# Patient Record
Sex: FEMALE | Race: WHITE | Employment: UNEMPLOYED | URBAN - METROPOLITAN AREA
[De-identification: names, ages, dates, MRNs, and addresses within clinical notes are randomized per-mention and may not be internally consistent; named-entity substitution may affect disease eponyms.]

---

## 2020-09-18 ENCOUNTER — APPOINTMENT (OUTPATIENT)
Dept: GENERAL RADIOLOGY | Age: 55
End: 2020-09-18
Payer: COMMERCIAL

## 2020-09-18 ENCOUNTER — HOSPITAL ENCOUNTER (EMERGENCY)
Age: 55
Discharge: HOME | End: 2020-09-18
Attending: EMERGENCY MEDICINE
Payer: COMMERCIAL

## 2020-09-18 VITALS
OXYGEN SATURATION: 99 % | HEART RATE: 88 BPM | BODY MASS INDEX: 40.3 KG/M2 | RESPIRATION RATE: 16 BRPM | TEMPERATURE: 98.1 F | HEIGHT: 58 IN | SYSTOLIC BLOOD PRESSURE: 150 MMHG | DIASTOLIC BLOOD PRESSURE: 86 MMHG | WEIGHT: 192 LBS

## 2020-09-18 PROCEDURE — 99283 EMERGENCY DEPT VISIT LOW MDM: CPT

## 2020-09-18 PROCEDURE — 6370000000 HC RX 637 (ALT 250 FOR IP): Performed by: EMERGENCY MEDICINE

## 2020-09-18 PROCEDURE — 73030 X-RAY EXAM OF SHOULDER: CPT

## 2020-09-18 RX ORDER — CYCLOBENZAPRINE HCL 10 MG
10 TABLET ORAL ONCE
Status: COMPLETED | OUTPATIENT
Start: 2020-09-18 | End: 2020-09-18

## 2020-09-18 RX ORDER — CYCLOBENZAPRINE HCL 10 MG
10 TABLET ORAL 2 TIMES DAILY PRN
Qty: 20 TABLET | Refills: 0 | Status: SHIPPED | OUTPATIENT
Start: 2020-09-18 | End: 2020-09-28 | Stop reason: SDUPTHER

## 2020-09-18 RX ADMIN — CYCLOBENZAPRINE 10 MG: 10 TABLET, FILM COATED ORAL at 17:36

## 2020-09-18 ASSESSMENT — PAIN DESCRIPTION - DESCRIPTORS
DESCRIPTORS: ACHING;SORE
DESCRIPTORS: ACHING

## 2020-09-18 ASSESSMENT — PAIN DESCRIPTION - LOCATION
LOCATION: SHOULDER
LOCATION: SHOULDER

## 2020-09-18 ASSESSMENT — ENCOUNTER SYMPTOMS
VOICE CHANGE: 0
TROUBLE SWALLOWING: 0
VOMITING: 0
ABDOMINAL PAIN: 0
WHEEZING: 0
SHORTNESS OF BREATH: 0
FACIAL SWELLING: 0
COLOR CHANGE: 0
NAUSEA: 0
STRIDOR: 0

## 2020-09-18 ASSESSMENT — PAIN DESCRIPTION - PAIN TYPE
TYPE: ACUTE PAIN
TYPE: ACUTE PAIN

## 2020-09-18 ASSESSMENT — PAIN SCALES - GENERAL
PAINLEVEL_OUTOF10: 5
PAINLEVEL_OUTOF10: 5

## 2020-09-18 ASSESSMENT — PAIN DESCRIPTION - ORIENTATION
ORIENTATION: RIGHT
ORIENTATION: RIGHT

## 2020-09-18 ASSESSMENT — PAIN DESCRIPTION - FREQUENCY: FREQUENCY: CONTINUOUS

## 2020-09-18 ASSESSMENT — PAIN - FUNCTIONAL ASSESSMENT: PAIN_FUNCTIONAL_ASSESSMENT: 0-10

## 2020-09-18 NOTE — ED TRIAGE NOTES
States she fell backwards while sitting in a deck chair. Attempted to catch herself and felt onset of pain. Is unable to abduct her shoulder.

## 2020-09-18 NOTE — ED PROVIDER NOTES
[address] [facility]  eMERGENCY dEPARTMENT eNCOUnter      Pt Name: Gia Salvador  MRN: [id number]  [place] 1965  Date of evaluation: 9/18/2020  Provider: Lucero Hawthorne MD    [address] [place] [address]       Chief Complaint   Patient presents with    Shoulder Pain         HISTORY OF PRESENT ILLNESS   (Location/Symptom, Timing/Onset, Context/Setting, Quality, Duration, Modifying Factors, Severity)  Note limiting factors. Gia Salvador is a 54 y.o. female who presents with approximately 2 hours of right shoulder pain. The patient reports that she was seated and fell over hitting her right elbow on the grassy ground. She denies any head neck or back injury or pain. She reports despite landing on her elbow she does not have any elbow pain but does have shoulder pain. She reports her shoulder pain is moderate, aching, constant, and worsening. Reports movement of the shoulder worsens the pain nothing improves it. She does report some limitation in abducting her shoulder. She denies any  strength weakness. She denies any numbness. HPI    Nursing Notes were reviewed. REVIEW OFSYSTEMS    (2-9 systems for level 4, 10 or more for level 5)     Review of Systems   Constitutional: Negative for appetite change, fever and unexpected weight change. HENT: Negative for facial swelling, trouble swallowing and voice change. Eyes: Negative for visual disturbance. Respiratory: Negative for shortness of breath, wheezing and stridor. Cardiovascular: Negative for chest pain and palpitations. Gastrointestinal: Negative for abdominal pain, nausea and vomiting. Genitourinary: Negative for dysuria and vaginal bleeding. Musculoskeletal: Positive for arthralgias. Negative for neck pain and neck stiffness. Skin: Negative for color change and wound. Neurological: Negative for seizures and syncope. Psychiatric/Behavioral: Negative for self-injury and suicidal ideas.        Except as noted above the remainder of the review of systems was reviewed and negative. PAST MEDICAL HISTORY     Past Medical History:   Diagnosis Date    COPD (chronic obstructive pulmonary disease) ([facility] [address]          SURGICAL HISTORY     History reviewed. No pertinent surgical history. CURRENT MEDICATIONS       Discharge Medication List as of 9/18/2020  5:54 PM      CONTINUE these medications which have NOT CHANGED    Details   ALBUTEROL IN Inhale  into the lungs. ALLERGIES     Patient has no known allergies. FAMILY HISTORY     History reviewed. No pertinent family history. SOCIAL HISTORY       Social History     Socioeconomic History    Marital status:      Spouse name: None    Number of children: None    Years of education: None    Highest education level: None   Occupational History    None   Social Needs    Financial resource strain: None    Food insecurity     Worry: None     Inability: None    Transportation needs     Medical: None     Non-medical: None   Tobacco Use    Smoking status: Current Every Day Smoker     Packs/day: 2.00    Smokeless tobacco: Never Used   Substance and Sexual Activity    Alcohol use:  Yes     Alcohol/week: 2.0 standard drinks     Types: 2 Standard drinks or equivalent per week    Drug use: Never    Sexual activity: None   Lifestyle    Physical activity     Days per week: None     Minutes per session: None    Stress: None   Relationships    Social connections     Talks on phone: None     Gets together: None     Attends Congregation service: None     Active member of club or organization: None     Attends meetings of clubs or organizations: None     Relationship status: None    Intimate partner violence     Fear of current or ex partner: None     Emotionally abused: None     Physically abused: None     Forced sexual activity: None   Other Topics Concern    None   Social History Narrative    None         PHYSICAL EXAM    (up to 7 for level 4, 8 or more for level 5)     ED Triage Vitals [09/18/20 1636]   BP Temp Temp Source Pulse Resp SpO2 Height Weight   (!) 148/87 98.1 °F (36.7 °C) Oral 97 18 97 % 4' 10\" (1.473 m) 192 lb (87.1 kg)       Physical Exam  Vitals signs and nursing note reviewed. Constitutional:       Appearance: She is well-developed. She is not diaphoretic. HENT:      Head: Normocephalic and atraumatic. Right Ear: External ear normal.      Left Ear: External ear normal.   Eyes:      Conjunctiva/sclera: Conjunctivae normal.   Neck:      Musculoskeletal: Neck supple. Vascular: No JVD. Trachea: No tracheal deviation. Cardiovascular:      Rate and Rhythm: Normal rate. Pulses: Normal pulses. Comments: 2+ popliteal, radial, ulnar pulses to the right upper extremity. Normal capillary refill to the right hand. Pulmonary:      Effort: Pulmonary effort is normal. No respiratory distress. Breath sounds: Normal breath sounds. No wheezing. Abdominal:      General: There is no distension. Palpations: Abdomen is soft. Tenderness: There is no abdominal tenderness. There is no guarding or rebound. Musculoskeletal: Normal range of motion. General: Tenderness (Mild right-sided anterior tenderness to palpation with no palpable deformity. No elbow, wrist, or hand tenderness. Full passive range of motion of right upper extremity but limited active abduction.) present. No swelling or deformity. Skin:     General: Skin is warm and dry. Neurological:      General: No focal deficit present. Mental Status: She is alert and oriented to person, place, and time. Cranial Nerves: No cranial nerve deficit. Comments: 5-5  strength equal bilaterally. Sensation motor function intact in radial, median, ulnar nerve distribution of the right upper extremity. Sensation intact in the axillary nerve distribution. Positive empty can test to the right side.          DIAGNOSTIC RESULTS       RADIOLOGY: Interpretation per the Radiologist below, if available at the time of this note:    XR SHOULDER RIGHT (MIN 2 VIEWS)   Final Result   No acute fracture or traumatic malalignment. Calcific tendinitis involving the infraspinatus tendon. ED BEDSIDE ULTRASOUND:   Performed by ED Physician - none    LABS:  Labs Reviewed - No data to display    All otherlabs were within normal range or not returned as of this dictation. EMERGENCY DEPARTMENT COURSE and DIFFERENTIAL DIAGNOSIS/MDM:   Vitals:    Vitals:    09/18/20 1636 09/18/20 1743   BP: (!) 148/87 (!) 150/86   Pulse: 97 88   Resp: 18 16   Temp: 98.1 °F (36.7 °C)    TempSrc: Oral    SpO2: 97% 99%   Weight: 192 lb (87.1 kg)    Height: 4' 10\" (1.473 m)          MDM  Plain film does show tendinitis but does not show any acute fractures location patient is neurovascularly intact. I primarily suspect rotator cuff injury based on history provided by the patient my physical exam but I have discussed with her the possibility of a peripheral nerve injury or occult fracture. I feel the patient is appropriate for NSAIDs, muscle relaxers, sling for comfort, and close orthopedic outpatient clinic follow-up. The importance of outpatient follow-up is stressed to the patient. Strict ER return precautions given for any numbness weakness or worsening of pain. The patient expresses understanding and agreement with this plan and is discharged home. I estimate there is low risk for COMPARTMENT SYNDROME, DEEP VENOUS THROMBOSIS, SEPTIC ARTHRITIS, TENDON OR NEUROVASCULAR INJURY, thus I consider the discharge disposition reasonable. The patient and I have discussed the diagnosis and risks, and we agree with discharging home to follow-up with their primary doctor or the referral orthopedist. We also discussed returning to the Emergency Department immediately if new or worsening symptoms occur.  We have discussed the symptoms which are most concerning (e.g., changing or worsening pain, numbness, weakness) that necessitate immediate return         Procedures    FINAL IMPRESSION      1. Acute pain of right shoulder          DISPOSITION/PLAN   DISPOSITION Decision To Discharge 09/18/2020 05:24:19 PM      PATIENT REFERRED TO:  Modesto Bolaños  [address] [facility]  [phone]  In 4 week      [address] [facility] Emergency Department  [address] [place] [address],[address]  [phone]    If symptoms worsen      DISCHARGE MEDICATIONS:  Discharge Medication List as of 9/18/2020  5:54 PM      START taking these medications    Details   cyclobenzaprine (FLEXERIL) 10 MG tablet Take 1 tablet by mouth 2 times daily as needed for Muscle spasms, Disp-20 tablet,R-0Print                (Please note that portions of this note were completed with a voice recognition program.  Efforts were made to edit the dictations but occasionally words aremis-transcribed. )    Dinesh Rizzo MD (electronically signed)  Attending Emergency Physician           Dinesh Rizzo MD  09/18/20 9312

## 2020-09-18 NOTE — ED NOTES
Sling fitted and placed on right upper extremity. Pt verbalizes understanding of use.       Lan Landaverde RN  09/18/20 8784

## 2020-09-23 ENCOUNTER — OFFICE VISIT (OUTPATIENT)
Dept: ORTHOPEDIC SURGERY | Age: 55
End: 2020-09-23
Payer: COMMERCIAL

## 2020-09-23 VITALS — HEIGHT: 58 IN | BODY MASS INDEX: 40.3 KG/M2 | WEIGHT: 192 LBS

## 2020-09-23 PROCEDURE — 3017F COLORECTAL CA SCREEN DOC REV: CPT | Performed by: ORTHOPAEDIC SURGERY

## 2020-09-23 PROCEDURE — 99203 OFFICE O/P NEW LOW 30 MIN: CPT | Performed by: ORTHOPAEDIC SURGERY

## 2020-09-23 PROCEDURE — G8417 CALC BMI ABV UP PARAM F/U: HCPCS | Performed by: ORTHOPAEDIC SURGERY

## 2020-09-23 PROCEDURE — 4004F PT TOBACCO SCREEN RCVD TLK: CPT | Performed by: ORTHOPAEDIC SURGERY

## 2020-09-23 PROCEDURE — G8427 DOCREV CUR MEDS BY ELIG CLIN: HCPCS | Performed by: ORTHOPAEDIC SURGERY

## 2020-09-23 NOTE — PROGRESS NOTES
review of systems will be found scanned in the patient's chart. CONSTITUTIONAL: Denies unexplained weight loss, fevers, chills   NEUROLOGICAL: Denies unsteady gait or progressive weakness  SKIN: Denies skin changes, delayed healing, rash, itching       PHYSICAL EXAM:    Vitals: Height 4' 9.99\" (1.473 m), weight 192 lb (87.1 kg). GENERAL EXAM:  · General Apparence: Patient is adequately groomed with no evidence of malnutrition. · Orientation: The patient is oriented to time, place and person. · Mood & Affect:The patient's mood and affect are appropriate       Right shoulder PHYSICAL EXAMINATION:  · Inspection: No visible deformity. No significant edema, erythema or ecchymosis. · Palpation: Tenderness to palpation at the subacromial space    · Range of Motion: Range of motion is significantly limited overhead particularly with abduction and forward flexion    · Strength: Strength is difficult to assess secondary to pain    · Special Tests: Positive Mathis testing. Difficulty with empty can testing. · Skin:  There are no rashes, ulcerations or lesions. · There are no dysvascular changes     Gait & station: Normal      Additional Examinations:        Left Upper Extremity: Examination of the left upper extremity does not show any tenderness, deformity or injury. Range of motion is unremarkable. There is no gross instability. There are no rashes, ulcerations or lesions. Strength and tone are normal.      Diagnostic Testing: The following x rays were read and interpreted by myself      1. X-rays of the right shoulder demonstrate calcific tendinitis of supraspinatus. Mild acromioclavicular arthritis and no acute fractures are noted.     Orders     Orders Placed This Encounter   Procedures    MRI SHOULDER RIGHT WO CONTRAST     Standing Status:   Future     Standing Expiration Date:   9/23/2021     Scheduling Instructions:      [facility] [address] @ [address]     Order Specific Question:   Reason for exam:     Answer:   RIGHT SHOULDER PAIN. R.O RCT         Assessment / Treatment Plan:     1. Right shoulder rotator cuff tendinitis versus tear    Given her acute injury and significant limitations with motion, we are going to obtain a MRI of the right shoulder to rule out rotator cuff tear. She will return to the clinic to review the results of the study. I have personally performed and/or participated in the history, exam and medical decision making and agree with all pertinent clinical information. I have also reviewed and agree with the past medical, family and social history unless otherwise noted. This dictation was performed with a verbal recognition program (DRAGON) and it was checked for errors. It is possible that there are still dictated errors within this office note. If so, please bring any errors to my attention for an addendum. All efforts were made to ensure that this office note is accurate.           Gordon Crisostomo MD

## 2020-09-28 ENCOUNTER — TELEPHONE (OUTPATIENT)
Dept: ORTHOPEDIC SURGERY | Age: 55
End: 2020-09-28

## 2020-09-28 RX ORDER — CYCLOBENZAPRINE HCL 10 MG
10 TABLET ORAL 2 TIMES DAILY PRN
Qty: 20 TABLET | Refills: 0 | Status: SHIPPED | OUTPATIENT
Start: 2020-09-28 | End: 2020-10-09

## 2020-09-30 ENCOUNTER — TELEPHONE (OUTPATIENT)
Dept: ORTHOPEDIC SURGERY | Age: 55
End: 2020-09-30

## 2020-09-30 NOTE — TELEPHONE ENCOUNTER
CALLED LVM FOR  PATIENT TODAY, STATING MRI IS APPROVED FOR [employer], & LEFT # FOR THEM TO CALL & SCHEDULE THAT. & TO MAKE AN FOLLOW UP APPT W/ DR. DUKE AFTER MRI.  [employer] Nakul Petty

## 2020-10-09 RX ORDER — CYCLOBENZAPRINE HCL 10 MG
10 TABLET ORAL 2 TIMES DAILY PRN
Qty: 20 TABLET | Refills: 0 | Status: SHIPPED | OUTPATIENT
Start: 2020-10-09 | End: 2020-10-22 | Stop reason: SDUPTHER

## 2020-10-12 ENCOUNTER — TELEPHONE (OUTPATIENT)
Dept: ORTHOPEDIC SURGERY | Age: 55
End: 2020-10-12

## 2020-10-12 NOTE — TELEPHONE ENCOUNTER
Patient called to discuss her MRI results. Her car would not start and she could not make it to her appointment. Unfortunately she has a tear of supraspinatus as well as the labrum. She also has some complex peritendinobursitis and capsulitis and a torn junction of the biceps. The next step would be for her to come in to discuss surgical intervention.   She can call and make an appointment when she can

## 2020-10-22 RX ORDER — CYCLOBENZAPRINE HCL 10 MG
10 TABLET ORAL 2 TIMES DAILY PRN
Qty: 20 TABLET | Refills: 0 | Status: SHIPPED | OUTPATIENT
Start: 2020-10-22 | End: 2020-10-29 | Stop reason: SDUPTHER

## 2020-10-26 ENCOUNTER — TELEPHONE (OUTPATIENT)
Dept: ORTHOPEDIC SURGERY | Age: 55
End: 2020-10-26

## 2020-10-29 ENCOUNTER — OFFICE VISIT (OUTPATIENT)
Dept: ORTHOPEDIC SURGERY | Age: 55
End: 2020-10-29
Payer: COMMERCIAL

## 2020-10-29 VITALS — HEIGHT: 58 IN | WEIGHT: 192 LBS | BODY MASS INDEX: 40.3 KG/M2

## 2020-10-29 PROCEDURE — 3017F COLORECTAL CA SCREEN DOC REV: CPT | Performed by: ORTHOPAEDIC SURGERY

## 2020-10-29 PROCEDURE — G8417 CALC BMI ABV UP PARAM F/U: HCPCS | Performed by: ORTHOPAEDIC SURGERY

## 2020-10-29 PROCEDURE — G8427 DOCREV CUR MEDS BY ELIG CLIN: HCPCS | Performed by: ORTHOPAEDIC SURGERY

## 2020-10-29 PROCEDURE — G8484 FLU IMMUNIZE NO ADMIN: HCPCS | Performed by: ORTHOPAEDIC SURGERY

## 2020-10-29 PROCEDURE — 99214 OFFICE O/P EST MOD 30 MIN: CPT | Performed by: ORTHOPAEDIC SURGERY

## 2020-10-29 PROCEDURE — 4004F PT TOBACCO SCREEN RCVD TLK: CPT | Performed by: ORTHOPAEDIC SURGERY

## 2020-10-29 PROCEDURE — L3670 SO ACRO/CLAV CAN WEB PRE OTS: HCPCS | Performed by: ORTHOPAEDIC SURGERY

## 2020-10-29 RX ORDER — CYCLOBENZAPRINE HCL 10 MG
10 TABLET ORAL 2 TIMES DAILY PRN
Qty: 40 TABLET | Refills: 0 | Status: SHIPPED | OUTPATIENT
Start: 2020-10-29 | End: 2020-11-23

## 2020-10-29 NOTE — PROGRESS NOTES
CHIEF COMPLAINT:    Chief Complaint   Patient presents with    Shoulder Pain     CK RIGHT SHOULDER, DISCUSS SX       HISTORY OF PRESENT ILLNESS:                The patient is a 54 y.o. female results of the right shoulder and discussed surgical treatment. She is an established patient with a MRI diagnosed rotator cuff tear and SLAP tear of the labrum. She remains very limited with range of motion and strength of the shoulder. She works as a . Past Medical History:   Diagnosis Date    COPD (chronic obstructive pulmonary disease) ([facility] [address]         Work Status: She works as a     The pain assessment was noted & is as follows:  Pain Assessment  Location of Pain: Shoulder  Location Modifiers: Right  Severity of Pain: 8  Quality of Pain: Aching  Duration of Pain: A few hours  Frequency of Pain: Intermittent]      Work Status/Functionality:     Past Medical History: Medical history form was reviewed today & can be found in the media tab  Past Medical History:   Diagnosis Date    COPD (chronic obstructive pulmonary disease) ([facility]       Past Surgical History:     No past surgical history on file. Current Medications:     Current Outpatient Medications:     cyclobenzaprine (FLEXERIL) 10 MG tablet, Take 1 tablet by mouth 2 times daily as needed for Muscle spasms, Disp: 40 tablet, Rfl: 0    ALBUTEROL IN, Inhale  into the lungs. , Disp: , Rfl:   Allergies:  Patient has no known allergies. Social History:    reports that she has been smoking. She has been smoking about 2.00 packs per day. She has never used smokeless tobacco. She reports current alcohol use of about 2.0 standard drinks of alcohol per week. She reports that she does not use drugs. Family History:   No family history on file. REVIEW OF SYSTEMS:   For new problems, a full review of systems will be found scanned in the patient's chart.   CONSTITUTIONAL: Denies unexplained weight loss, fevers, chills   NEUROLOGICAL: Denies unsteady gait or progressive weakness  SKIN: Denies skin changes, delayed healing, rash, itching       PHYSICAL EXAM:    Vitals: Height 4' 9.99\" (1.473 m), weight 192 lb (87.1 kg). GENERAL EXAM:  · General Apparence: Patient is adequately groomed with no evidence of malnutrition. · Orientation: The patient is oriented to time, place and person. · Mood & Affect:The patient's mood and affect are appropriate       Right shoulder PHYSICAL EXAMINATION:  · Inspection: No visible deformity. No significant edema, erythema or ecchymosis    · Palpation: Tenderness to palpation    · Range of Motion: Range of motion is limited overhead to approximately 90 degrees degrees of abduction    · Strength: Isolated supraspinous strength testing reproduces weakness    · Special Tests: None performed today          · Skin:  There are no rashes, ulcerations or lesions. · There are no dysvascular changes     Gait & station: Normal      Additional Examinations:        Left Upper Extremity: Examination of the left upper extremity does not show any tenderness, deformity or injury. Range of motion is unremarkable. There is no gross instability. There are no rashes, ulcerations or lesions. Strength and tone are normal.      Diagnostic Testing: The following x rays were read and interpreted by myself      1. MRI of the right shoulder demonstrates a complete minimally retracted tear of the supraspinatus tendon bursal fibers infraspinatus insertions are torn. Moderate complex peritendinobursitis and capsulitis. Mild muscle fatty atrophy. Calcification adjacent to the infraspinatus insertion consistent with peritendinitis calcarea  2. Torn junction of the bicep arcuate and proximal vertical segments. Moderate inflammation within the biceps sheath. 3.  Frayed hypertrophic subscapularis insertion. 4. SLAP type IIb tear.     Orders     Orders Placed This Encounter   Procedures    Breg Sling Shot 2 Shoulder Sling     Patient was prescribed a Breg Sling Shot II Shoulder Brace. The right shoulder will require stabilization / immobilization from this orthosis. The orthosis will assist in protecting the affected area, provide functional support and facilitate healing. The device was ordered and fit on 10/29/2020. The patient was educated and fit by a healthcare professional with expert knowledge and specialization in brace application while under the direct supervision of the treating physician. Verbal and written instructions for the use of and application of this item were provided. They were instructed to contact the office immediately should the brace result in increased pain, decreased sensation, increased swelling or worsening of the condition. Assessment / Treatment Plan:     1. Rotator cuff tear right shoulder    2. SLAP labral tear right shoulder    Discussed the nature of her injury and reviewed her MRI results. We discussed with her that given the full-thickness nature of her rotator cuff tear, we will need to repair this surgically. We discussed a possible repair versus debridement of the labrum. Plan to perform a right shoulder video arthroscopy with neer acromioplasty, debridement versus repair of the labrum, and mini open rotator cuff repair    Provided with a postop sling today    We discussed shoulder arthroscopy surgery in detail. We talked about the arthroscopic nature of the procedure, the portals utilized and what can be done through these portals. We also discussed concerns regarding surgery, specifically including infection, deep vein thrombosis, pulmonary dystrophy, arthrofibrosis, delayed rehabilitation, etc.  We also discussed the possibility of an interscalene block and that anesthesia personnel would talk to them about this procedure and any concerns in that regard.   We also touched briefly on the degree of rotator cuff damage that can be treated and labral damage that can be treated and how it is always possible that the injury is more severe than expected on clinical examination by MRI. The reality is that if there is a full thickness rotator cuff tear, the treatment is dramatically different and the rehabilitation much slower. The same would be true of labral pathology, whether it be an anterior labral tear or a slap lesion that would potentially require repair versus a lesion that can just be debrided. We also discussed prophylaxis in terms of positioning carefully intravenous antibiotics preoperative, etc. All questions were answered from the patient. 3.  Tobacco abuse    I have educated her about the impacts of tobacco use and soft tissue healing after surgery like this. She understands that her smoking may delay her healing postoperatively. I have personally performed and/or participated in the history, exam and medical decision making and agree with all pertinent clinical information. I have also reviewed and agree with the past medical, family and social history unless otherwise noted. This dictation was performed with a verbal recognition program (DRAGON) and it was checked for errors. It is possible that there are still dictated errors within this office note. If so, please bring any errors to my attention for an addendum. All efforts were made to ensure that this office note is accurate.           Mamie Leonardo MD

## 2020-11-05 ENCOUNTER — TELEPHONE (OUTPATIENT)
Dept: ORTHOPEDIC SURGERY | Age: 55
End: 2020-11-05

## 2020-11-05 NOTE — TELEPHONE ENCOUNTER
CPT: 83289, [id number], [id number]  AUTHORIZATION: pending  BODY PART: right shoulder    Clinicals faxed to [facility] 11/5/20    [facility] denied [id number]  If you would like to set up a peer to peer call [phone] within 5 business days.  See denial letter under media

## 2020-11-16 NOTE — PROGRESS NOTES
Sapna Whiteside    Age 54 y.o.    female    1965    MRN [id number]    11/24/2020  Arrival Time_____________  OR Time____________90 Min     Procedure(s):  EXAM UNDER ANESTHESIA VIDEO ARTHROSCOPY RIGHT SHOULDER, MINI-OPEN ROTATOR CUFF REPAIR, NEER ACROMIOPLASTY, DEBRIDEMENT VERSUS REPAIR OF LABRUM -EXPAREL-                      General    Surgeon(s):  Dara Hoyos, MD       Phone [phone] ([place])     [address] [place] [address]  Cell Work  _________________________________________________________________  _________________________________________________________________  _________________________________________________________________  _________________________________________________________________  _________________________________________________________________      PCP _____________________________ Phone_________________       H&P__________________Bringing    Chart            Epic  DOS     Called_______  EKG__________________Bringing    Chart            Epic  DOS     Called_______  LAB__________________ Bringing    Chart            Epic  DOS     Called_______  Cardiac Clearance_______Bringing    Chart            Epic      DOS       Called_______    Cardiologist________________________ Phone___________________________      ? Sabianist concerns / Waiver on Chart            PAT Communications_____________  ? Pre-op Instructions Given [place]          ______________________________  ? Directions to Surgery Center                          ______________________________  ? Transportation Home_______________      _______________________________  ?  Crutches/Walker__________________        _______________________________      ________Pre-op Orders   _______Transcribed    _______Wt.  ________Pharmacy          _______SCD  ______VTE     ______[facility]  ________Consent             ________TED [place]

## 2020-11-17 NOTE — PROGRESS NOTES
Preoperative Screening for Elective Surgery/Invasive Procedures While COVID-19 present in the community     Have you tested positive or have been told to self-isolate for COVID-19 like symptoms within the past 28 days? No   Do you currently have any of the following symptoms? No  o Fever >100.0 F or 99.9 F in immunocompromised patients? No  o New onset cough, shortness of breath or difficulty breathing? No  o New onset sore throat, myalgia (muscle aches and pains), headache, loss of taste/smell or diarrhea? No   Have you had a potential exposure to COVID-19 within the past 14 days by:  o Close contact with a confirmed case? No  o Close contact with a healthcare worker,  or essential infrastructure worker (grocery store, [employer], gas station, public utilities or transportation)? No  o Do you reside in a congregate setting such as; skilled nursing facility, adult home, correctional facility, homeless shelter or other institutional setting? No  o Have you had recent travel to a known COVID-19 hotspot? No    Indicate if the patient has a positive screen by answering yes to one or more of the above questions. Patients who test positive or screen positive prior to surgery or on the day of surgery should be evaluated in conjunction with the surgeon/proceduralist/anesthesiologist to determine the urgency of the procedure.   Melissa Malave

## 2020-11-17 NOTE — PROGRESS NOTES
Obstructive Sleep Apnea (DALLAS) Screening     Patient:  Swetha Yadav    YOB: 1965      Medical Record #:  [id number]                     Date:  11/17/2020     1. Are you a loud and/or regular snorer? [x]  Yes       [] No    2. Have you been observed to gasp or stop breathing during sleep? []  Yes       [x] No    3. Do you feel tired or groggy upon awakening or do you awaken with a headache?           []  Yes       [x] No    4. Are you often tired or fatigued during the wake time hours? []  Yes       [x] No    5. Do you fall asleep sitting, reading, watching TV or driving? []  Yes       [x] No    6. Do you often have problems with memory or concentration? []  Yes       [x] No    **If patient's score is ? 3 they are considered high risk for DALLAS. An Anesthesia provider will evaluate the patient and develop a plan of care the day of surgery. Note:  If the patient's BMI is more than 35 kg m¯² , has neck circumference > 40 cm, and/or high blood pressure the risk is greater (© American Sleep Apnea Association, 2006).

## 2020-11-17 NOTE — PROGRESS NOTES
Patient instructed to:  Bring picture ID, insurance card,proof of address  Dress in comfortable,loose clothing,no jewelry, no make up/or finger polish/nocontact lenses dos if applicable  ALL Canda Fuelling on operative limb must be removed prior to DOS -if applicable  Nothing to eat or drink after midnight the night before surgery   If instructed to take medicines day of surgery by PCP, take only with sips of water  If you are taking insulin or diabetic medications check with your PCP to adjust doses according to your NPO (not eating) status  Arrange for transportation to and from surgery  With a caregiver staying with you for 24 hours  --make sure you are bring appropriate clothes to fit over large operative drsg - if applicable  Bring copies of H&P and or ekg dos     If your are taking NSAIDS/ASA products/vitamins regularly confirm this with your surgeon regarding taking them preop 5 days before surgery     Notify your Surgeon if you develop any illness between now and surgery time; cough, cold, fever, sore throat, nausea, vomiting,  covid  symtoms etc.

## 2020-11-19 ENCOUNTER — OFFICE VISIT (OUTPATIENT)
Dept: PRIMARY CARE CLINIC | Age: 55
End: 2020-11-19
Payer: COMMERCIAL

## 2020-11-19 PROCEDURE — 99211 OFF/OP EST MAY X REQ PHY/QHP: CPT | Performed by: NURSE PRACTITIONER

## 2020-11-19 NOTE — PROGRESS NOTES
Juanita Medina received a viral test for COVID-19. They were educated on isolation and quarantine as appropriate. For any symptoms, they were directed to seek care from their PCP, given contact information to establish with a doctor, directed to an urgent care or the emergency room.

## 2020-11-20 LAB — SARS-COV-2: NOT DETECTED

## 2020-11-23 ENCOUNTER — ANESTHESIA EVENT (OUTPATIENT)
Dept: OPERATING ROOM | Age: 55
End: 2020-11-23
Payer: COMMERCIAL

## 2020-11-23 RX ORDER — CYCLOBENZAPRINE HCL 10 MG
10 TABLET ORAL 2 TIMES DAILY PRN
Qty: 40 TABLET | Refills: 0 | Status: SHIPPED | OUTPATIENT
Start: 2020-11-23 | End: 2020-12-23 | Stop reason: SDUPTHER

## 2020-11-24 ENCOUNTER — ANESTHESIA (OUTPATIENT)
Dept: OPERATING ROOM | Age: 55
End: 2020-11-24
Payer: COMMERCIAL

## 2020-11-24 ENCOUNTER — HOSPITAL ENCOUNTER (OUTPATIENT)
Age: 55
Setting detail: OUTPATIENT SURGERY
Discharge: HOME | End: 2020-11-24
Attending: ORTHOPAEDIC SURGERY | Admitting: ORTHOPAEDIC SURGERY
Payer: COMMERCIAL

## 2020-11-24 VITALS
DIASTOLIC BLOOD PRESSURE: 51 MMHG | HEIGHT: 58 IN | WEIGHT: 195 LBS | OXYGEN SATURATION: 100 % | TEMPERATURE: 97 F | SYSTOLIC BLOOD PRESSURE: 127 MMHG | HEART RATE: 89 BPM | RESPIRATION RATE: 12 BRPM | BODY MASS INDEX: 40.93 KG/M2

## 2020-11-24 VITALS
OXYGEN SATURATION: 99 % | RESPIRATION RATE: 6 BRPM | DIASTOLIC BLOOD PRESSURE: 112 MMHG | SYSTOLIC BLOOD PRESSURE: 153 MMHG

## 2020-11-24 PROCEDURE — 2580000003 HC RX 258: Performed by: ANESTHESIOLOGY

## 2020-11-24 PROCEDURE — 3700000001 HC ADD 15 MINUTES (ANESTHESIA): Performed by: ORTHOPAEDIC SURGERY

## 2020-11-24 PROCEDURE — 6370000000 HC RX 637 (ALT 250 FOR IP): Performed by: ANESTHESIOLOGY

## 2020-11-24 PROCEDURE — 2500000003 HC RX 250 WO HCPCS: Performed by: NURSE ANESTHETIST, CERTIFIED REGISTERED

## 2020-11-24 PROCEDURE — 6360000002 HC RX W HCPCS: Performed by: ANESTHESIOLOGY

## 2020-11-24 PROCEDURE — 7100000011 HC PHASE II RECOVERY - ADDTL 15 MIN: Performed by: ORTHOPAEDIC SURGERY

## 2020-11-24 PROCEDURE — 7100000010 HC PHASE II RECOVERY - FIRST 15 MIN: Performed by: ORTHOPAEDIC SURGERY

## 2020-11-24 PROCEDURE — C9290 INJ, BUPIVACAINE LIPOSOME: HCPCS | Performed by: ORTHOPAEDIC SURGERY

## 2020-11-24 PROCEDURE — 6360000002 HC RX W HCPCS: Performed by: NURSE ANESTHETIST, CERTIFIED REGISTERED

## 2020-11-24 PROCEDURE — 3600000014 HC SURGERY LEVEL 4 ADDTL 15MIN: Performed by: ORTHOPAEDIC SURGERY

## 2020-11-24 PROCEDURE — 2500000003 HC RX 250 WO HCPCS: Performed by: ORTHOPAEDIC SURGERY

## 2020-11-24 PROCEDURE — 2720000010 HC SURG SUPPLY STERILE: Performed by: ORTHOPAEDIC SURGERY

## 2020-11-24 PROCEDURE — 7100000000 HC PACU RECOVERY - FIRST 15 MIN: Performed by: ORTHOPAEDIC SURGERY

## 2020-11-24 PROCEDURE — 2580000003 HC RX 258: Performed by: ORTHOPAEDIC SURGERY

## 2020-11-24 PROCEDURE — 3600000004 HC SURGERY LEVEL 4 BASE: Performed by: ORTHOPAEDIC SURGERY

## 2020-11-24 PROCEDURE — 2709999900 HC NON-CHARGEABLE SUPPLY: Performed by: ORTHOPAEDIC SURGERY

## 2020-11-24 PROCEDURE — C1713 ANCHOR/SCREW BN/BN,TIS/BN: HCPCS | Performed by: ORTHOPAEDIC SURGERY

## 2020-11-24 PROCEDURE — 3700000000 HC ANESTHESIA ATTENDED CARE: Performed by: ORTHOPAEDIC SURGERY

## 2020-11-24 PROCEDURE — 7100000001 HC PACU RECOVERY - ADDTL 15 MIN: Performed by: ORTHOPAEDIC SURGERY

## 2020-11-24 PROCEDURE — 6360000002 HC RX W HCPCS: Performed by: ORTHOPAEDIC SURGERY

## 2020-11-24 DEVICE — HEALICOIL RG SA 5.5MM W/2 UB-BL                                    CBRD BL
Type: IMPLANTABLE DEVICE | Site: SHOULDER | Status: FUNCTIONAL
Brand: HEALICOIL / ULTRABRAID

## 2020-11-24 DEVICE — MULTIFIX S-ULTRA 5.5MM KNOTLESS ANCHOR
Type: IMPLANTABLE DEVICE | Site: SHOULDER | Status: FUNCTIONAL
Brand: MULTIFIX

## 2020-11-24 RX ORDER — CEFAZOLIN SODIUM 1 G/3ML
INJECTION, POWDER, FOR SOLUTION INTRAMUSCULAR; INTRAVENOUS PRN
Status: DISCONTINUED | OUTPATIENT
Start: 2020-11-24 | End: 2020-11-24 | Stop reason: SDUPTHER

## 2020-11-24 RX ORDER — ONDANSETRON 2 MG/ML
INJECTION INTRAMUSCULAR; INTRAVENOUS PRN
Status: DISCONTINUED | OUTPATIENT
Start: 2020-11-24 | End: 2020-11-24 | Stop reason: SDUPTHER

## 2020-11-24 RX ORDER — MORPHINE SULFATE 2 MG/ML
2 INJECTION, SOLUTION INTRAMUSCULAR; INTRAVENOUS EVERY 5 MIN PRN
Status: DISCONTINUED | OUTPATIENT
Start: 2020-11-24 | End: 2020-11-24 | Stop reason: HOSPADM

## 2020-11-24 RX ORDER — MORPHINE SULFATE 2 MG/ML
1 INJECTION, SOLUTION INTRAMUSCULAR; INTRAVENOUS EVERY 5 MIN PRN
Status: DISCONTINUED | OUTPATIENT
Start: 2020-11-24 | End: 2020-11-24 | Stop reason: HOSPADM

## 2020-11-24 RX ORDER — OXYCODONE HYDROCHLORIDE AND ACETAMINOPHEN 5; 325 MG/1; MG/1
2 TABLET ORAL PRN
Status: COMPLETED | OUTPATIENT
Start: 2020-11-24 | End: 2020-11-24

## 2020-11-24 RX ORDER — MEPERIDINE HYDROCHLORIDE 50 MG/ML
12.5 INJECTION INTRAMUSCULAR; INTRAVENOUS; SUBCUTANEOUS EVERY 5 MIN PRN
Status: DISCONTINUED | OUTPATIENT
Start: 2020-11-24 | End: 2020-11-24 | Stop reason: HOSPADM

## 2020-11-24 RX ORDER — BUPIVACAINE HYDROCHLORIDE 2.5 MG/ML
INJECTION, SOLUTION INFILTRATION; PERINEURAL PRN
Status: DISCONTINUED | OUTPATIENT
Start: 2020-11-24 | End: 2020-11-24 | Stop reason: ALTCHOICE

## 2020-11-24 RX ORDER — LIDOCAINE HYDROCHLORIDE 10 MG/ML
1 INJECTION, SOLUTION EPIDURAL; INFILTRATION; INTRACAUDAL; PERINEURAL
Status: DISCONTINUED | OUTPATIENT
Start: 2020-11-24 | End: 2020-11-24 | Stop reason: HOSPADM

## 2020-11-24 RX ORDER — SODIUM CHLORIDE 0.9 % (FLUSH) 0.9 %
10 SYRINGE (ML) INJECTION EVERY 12 HOURS SCHEDULED
Status: DISCONTINUED | OUTPATIENT
Start: 2020-11-24 | End: 2020-11-24 | Stop reason: HOSPADM

## 2020-11-24 RX ORDER — FENTANYL CITRATE 50 UG/ML
INJECTION, SOLUTION INTRAMUSCULAR; INTRAVENOUS PRN
Status: DISCONTINUED | OUTPATIENT
Start: 2020-11-24 | End: 2020-11-24 | Stop reason: SDUPTHER

## 2020-11-24 RX ORDER — ONDANSETRON 2 MG/ML
4 INJECTION INTRAMUSCULAR; INTRAVENOUS
Status: DISCONTINUED | OUTPATIENT
Start: 2020-11-24 | End: 2020-11-24 | Stop reason: HOSPADM

## 2020-11-24 RX ORDER — MIDAZOLAM HYDROCHLORIDE 1 MG/ML
INJECTION INTRAMUSCULAR; INTRAVENOUS PRN
Status: DISCONTINUED | OUTPATIENT
Start: 2020-11-24 | End: 2020-11-24 | Stop reason: SDUPTHER

## 2020-11-24 RX ORDER — OXYCODONE HYDROCHLORIDE AND ACETAMINOPHEN 5; 325 MG/1; MG/1
1 TABLET ORAL EVERY 6 HOURS PRN
Qty: 28 TABLET | Refills: 0 | Status: SHIPPED | OUTPATIENT
Start: 2020-11-24 | End: 2020-11-27 | Stop reason: SDUPTHER

## 2020-11-24 RX ORDER — DEXAMETHASONE SODIUM PHOSPHATE 10 MG/ML
INJECTION INTRAMUSCULAR; INTRAVENOUS PRN
Status: DISCONTINUED | OUTPATIENT
Start: 2020-11-24 | End: 2020-11-24 | Stop reason: SDUPTHER

## 2020-11-24 RX ORDER — SODIUM CHLORIDE 0.9 % (FLUSH) 0.9 %
10 SYRINGE (ML) INJECTION PRN
Status: DISCONTINUED | OUTPATIENT
Start: 2020-11-24 | End: 2020-11-24 | Stop reason: HOSPADM

## 2020-11-24 RX ORDER — ROCURONIUM BROMIDE 10 MG/ML
INJECTION, SOLUTION INTRAVENOUS PRN
Status: DISCONTINUED | OUTPATIENT
Start: 2020-11-24 | End: 2020-11-24 | Stop reason: SDUPTHER

## 2020-11-24 RX ORDER — OXYCODONE HYDROCHLORIDE AND ACETAMINOPHEN 5; 325 MG/1; MG/1
1 TABLET ORAL PRN
Status: COMPLETED | OUTPATIENT
Start: 2020-11-24 | End: 2020-11-24

## 2020-11-24 RX ORDER — SODIUM CHLORIDE, SODIUM LACTATE, POTASSIUM CHLORIDE, CALCIUM CHLORIDE 600; 310; 30; 20 MG/100ML; MG/100ML; MG/100ML; MG/100ML
INJECTION, SOLUTION INTRAVENOUS CONTINUOUS
Status: DISCONTINUED | OUTPATIENT
Start: 2020-11-24 | End: 2020-11-24 | Stop reason: HOSPADM

## 2020-11-24 RX ORDER — PROPOFOL 10 MG/ML
INJECTION, EMULSION INTRAVENOUS PRN
Status: DISCONTINUED | OUTPATIENT
Start: 2020-11-24 | End: 2020-11-24 | Stop reason: SDUPTHER

## 2020-11-24 RX ORDER — LABETALOL HYDROCHLORIDE 5 MG/ML
INJECTION, SOLUTION INTRAVENOUS PRN
Status: DISCONTINUED | OUTPATIENT
Start: 2020-11-24 | End: 2020-11-24 | Stop reason: SDUPTHER

## 2020-11-24 RX ADMIN — ONDANSETRON 4 MG: 2 INJECTION INTRAMUSCULAR; INTRAVENOUS at 14:27

## 2020-11-24 RX ADMIN — PROPOFOL 200 MG: 10 INJECTION, EMULSION INTRAVENOUS at 14:08

## 2020-11-24 RX ADMIN — FENTANYL CITRATE 100 MCG: 50 INJECTION INTRAMUSCULAR; INTRAVENOUS at 14:08

## 2020-11-24 RX ADMIN — ROCURONIUM BROMIDE 50 MG: 10 SOLUTION INTRAVENOUS at 14:08

## 2020-11-24 RX ADMIN — SUGAMMADEX 200 MG: 100 INJECTION, SOLUTION INTRAVENOUS at 15:05

## 2020-11-24 RX ADMIN — CEFAZOLIN 2000 MG: 1 INJECTION, POWDER, FOR SOLUTION INTRAMUSCULAR; INTRAVENOUS at 14:04

## 2020-11-24 RX ADMIN — DEXAMETHASONE SODIUM PHOSPHATE 10 MG: 10 INJECTION INTRAMUSCULAR; INTRAVENOUS at 14:27

## 2020-11-24 RX ADMIN — HYDROMORPHONE HYDROCHLORIDE 0.5 MG: 1 INJECTION, SOLUTION INTRAMUSCULAR; INTRAVENOUS; SUBCUTANEOUS at 15:38

## 2020-11-24 RX ADMIN — HYDROMORPHONE HYDROCHLORIDE 0.5 MG: 1 INJECTION, SOLUTION INTRAMUSCULAR; INTRAVENOUS; SUBCUTANEOUS at 15:50

## 2020-11-24 RX ADMIN — LABETALOL HYDROCHLORIDE 5 MG: 5 INJECTION, SOLUTION INTRAVENOUS at 14:14

## 2020-11-24 RX ADMIN — HYDROMORPHONE HYDROCHLORIDE 0.5 MG: 1 INJECTION, SOLUTION INTRAMUSCULAR; INTRAVENOUS; SUBCUTANEOUS at 15:25

## 2020-11-24 RX ADMIN — FENTANYL CITRATE 50 MCG: 50 INJECTION INTRAMUSCULAR; INTRAVENOUS at 15:12

## 2020-11-24 RX ADMIN — FENTANYL CITRATE 50 MCG: 50 INJECTION INTRAMUSCULAR; INTRAVENOUS at 14:14

## 2020-11-24 RX ADMIN — HYDROMORPHONE HYDROCHLORIDE 0.5 MG: 1 INJECTION, SOLUTION INTRAMUSCULAR; INTRAVENOUS; SUBCUTANEOUS at 15:32

## 2020-11-24 RX ADMIN — MIDAZOLAM HYDROCHLORIDE 2 MG: 2 INJECTION, SOLUTION INTRAMUSCULAR; INTRAVENOUS at 14:08

## 2020-11-24 RX ADMIN — SODIUM CHLORIDE, POTASSIUM CHLORIDE, SODIUM LACTATE AND CALCIUM CHLORIDE: 600; 310; 30; 20 INJECTION, SOLUTION INTRAVENOUS at 12:50

## 2020-11-24 RX ADMIN — SODIUM CHLORIDE, POTASSIUM CHLORIDE, SODIUM LACTATE AND CALCIUM CHLORIDE: 600; 310; 30; 20 INJECTION, SOLUTION INTRAVENOUS at 15:04

## 2020-11-24 RX ADMIN — OXYCODONE HYDROCHLORIDE AND ACETAMINOPHEN 2 TABLET: 5; 325 TABLET ORAL at 15:35

## 2020-11-24 RX ADMIN — FENTANYL CITRATE 50 MCG: 50 INJECTION INTRAMUSCULAR; INTRAVENOUS at 15:14

## 2020-11-24 ASSESSMENT — PULMONARY FUNCTION TESTS
PIF_VALUE: 28
PIF_VALUE: 30
PIF_VALUE: 20
PIF_VALUE: 28
PIF_VALUE: 34
PIF_VALUE: 34
PIF_VALUE: 32
PIF_VALUE: 31
PIF_VALUE: 32
PIF_VALUE: 35
PIF_VALUE: 27
PIF_VALUE: 7
PIF_VALUE: 28
PIF_VALUE: 13
PIF_VALUE: 28
PIF_VALUE: 0
PIF_VALUE: 0
PIF_VALUE: 2
PIF_VALUE: 31
PIF_VALUE: 26
PIF_VALUE: 15
PIF_VALUE: 3
PIF_VALUE: 30
PIF_VALUE: 25
PIF_VALUE: 25
PIF_VALUE: 32
PIF_VALUE: 30
PIF_VALUE: 0
PIF_VALUE: 27
PIF_VALUE: 31
PIF_VALUE: 32
PIF_VALUE: 27
PIF_VALUE: 34
PIF_VALUE: 0
PIF_VALUE: 29
PIF_VALUE: 29
PIF_VALUE: 30
PIF_VALUE: 28
PIF_VALUE: 32
PIF_VALUE: 26
PIF_VALUE: 30
PIF_VALUE: 19
PIF_VALUE: 25
PIF_VALUE: 1
PIF_VALUE: 25
PIF_VALUE: 3
PIF_VALUE: 29
PIF_VALUE: 30
PIF_VALUE: 2
PIF_VALUE: 31
PIF_VALUE: 30
PIF_VALUE: 26
PIF_VALUE: 35
PIF_VALUE: 28
PIF_VALUE: 34
PIF_VALUE: 28
PIF_VALUE: 31
PIF_VALUE: 28
PIF_VALUE: 15
PIF_VALUE: 28
PIF_VALUE: 29
PIF_VALUE: 33
PIF_VALUE: 32
PIF_VALUE: 26
PIF_VALUE: 28
PIF_VALUE: 31
PIF_VALUE: 3
PIF_VALUE: 26
PIF_VALUE: 28
PIF_VALUE: 28
PIF_VALUE: 31
PIF_VALUE: 15
PIF_VALUE: 32
PIF_VALUE: 28
PIF_VALUE: 18

## 2020-11-24 ASSESSMENT — PAIN SCALES - GENERAL
PAINLEVEL_OUTOF10: 8
PAINLEVEL_OUTOF10: 10
PAINLEVEL_OUTOF10: 8
PAINLEVEL_OUTOF10: 8
PAINLEVEL_OUTOF10: 0
PAINLEVEL_OUTOF10: 8
PAINLEVEL_OUTOF10: 9
PAINLEVEL_OUTOF10: 7
PAINLEVEL_OUTOF10: 5
PAINLEVEL_OUTOF10: 4
PAINLEVEL_OUTOF10: 10

## 2020-11-24 ASSESSMENT — ENCOUNTER SYMPTOMS: SHORTNESS OF BREATH: 1

## 2020-11-24 ASSESSMENT — LIFESTYLE VARIABLES: SMOKING_STATUS: 1

## 2020-11-24 NOTE — H&P
I have reviewed the history and physical and examined the patient and find no relevant changes. I have reviewed with the patient and/or family the risks, benefits, and alternatives to the procedure. Patient being given increased dosage/quantity of opoid pain medication in excess of OSMB guidelines which noted a 30 MED daily of opioids due to the fact that he/she has undergone major orthopaedic surgery as outlined in rule 4731-11-13. Dosages and further duration of the pain medication will be re-evaluated at her post op visit in 2 weeks. Patient was educated on dosing expectations and limits of prescribing as a result of the new [facility] Board rules enacted August 31, 2017. Please also note that this is not the initial opoid prescription issued to this patient but a continuation of medication utilized during the hospital admission as noted in the medical record. OARRS report has also been utilized to screen for any abuse history or suspicious activity as outlined in [place]. All efforts have been taken to prevent abuse potential and misuse of opioid medications including education, screening, and close clinical follow up. Controlled Substance Monitoring:    Acute and Chronic Pain Monitoring:   RX Monitoring 11/24/2020   Periodic Controlled Substance Monitoring No signs of potential drug abuse or diversion identified.

## 2020-11-24 NOTE — BRIEF OP NOTE
Brief Postoperative Note      Patient: Hansa Ambrosio  YOB: 1965  MRN: [id number]    Date of Procedure: 11/24/2020    Pre-Op Diagnosis: ROTATOR CUFF TEAR RIGHT SHOULDER    Post-Op Diagnosis: Same       Procedure(s):  EXAM UNDER ANESTHESIA VIDEO ARTHROSCOPY RIGHT SHOULDER, MINI-OPEN ROTATOR CUFF REPAIR, NEER ACROMIOPLASTY, DEBRIDEMENT LABRUM -EXPAREL-    Surgeon(s):  Alyssa Delgado MD    Assistant:  Surgical Assistant: Phil Bellamy  Physician Assistant: FER Jimenez    Anesthesia: General    Estimated Blood Loss (mL): less than 50     Complications: None    Specimens:   * No specimens in log *    Implants:  Implant Name Type Inv. Item Serial No.  Lot No. LRB No. Used Action   ANCHOR SUT DIA5. 5MM KNOTLESS MULTIFIX S-ULTRA  ANCHOR SUT DIA5. 5MM KNOTLESS MULTIFIX S-ULTRA  [employer]-WD  Right 2 Implanted   ANCHOR SUT SZ 2-0 ULTRABRAID HERNÁN COBRAID SUT PRELD  ANCHOR SUT SZ 2-0 ULTRABRAID HERNÁN COBRAID SUT PRELD  [employer]-WD  Right 2 Implanted         Drains: * No LDAs found *    Findings: RTC tear    Electronically signed by FER Mauricio on 11/24/2020 at 5:09 PM

## 2020-11-24 NOTE — PROGRESS NOTES
Discharged in no distress accompanied to passenger side of car with family or significant other driving car. Assessment unchanged. Patient states pain tolerable.

## 2020-11-24 NOTE — ANESTHESIA PRE PROCEDURE
Topics    Alcohol use: Yes     Alcohol/week: 2.0 standard drinks     Types: 2 Standard drinks or equivalent per week     Comment: 3-5 shots a week                                Ready to quit: Yes  Counseling given: Yes  Comment: pt will discuss with PCP quitting options      Vital Signs (Current):   Vitals:    11/17/20 1413   Weight: 195 lb (88.5 kg)   Height: 4' 10\" (1.473 m)                                              BP Readings from Last 3 Encounters:   09/18/20 (!) 150/86   06/25/14 125/81       NPO Status:  MN+, SEE MAR                                                                               BMI:   Wt Readings from Last 3 Encounters:   11/17/20 195 lb (88.5 kg)   10/29/20 192 lb (87.1 kg)   09/23/20 192 lb (87.1 kg)     Body mass index is 40.76 kg/m². CBC: No results found for: WBC, RBC, HGB, HCT, MCV, RDW, PLT    CMP: No results found for: NA, K, CL, CO2, BUN, CREATININE, GFRAA, AGRATIO, LABGLOM, GLUCOSE, PROT, CALCIUM, BILITOT, ALKPHOS, AST, ALT    POC Tests: No results for input(s): POCGLU, POCNA, POCK, POCCL, POCBUN, POCHEMO, POCHCT in the last 72 hours.     Coags: No results found for: PROTIME, INR, APTT    HCG (If Applicable): No results found for: PREGTESTUR, PREGSERUM, HCG, HCGQUANT     ABGs: No results found for: PHART, PO2ART, IAX1TKG, VBD2FTP, BEART, N0DPHBGC     Type & Screen (If Applicable):  No results found for: LABABO, LABRH    Drug/Infectious Status (If Applicable):  No results found for: HIV, HEPCAB    COVID-19 Screening (If Applicable):   Lab Results   Component Value Date    COVID19 Not Detected 11/19/2020         Anesthesia Evaluation  Patient summary reviewed no history of anesthetic complications:   Airway: Mallampati: III  TM distance: <3 FB   Neck ROM: limited  Mouth opening: < 3 FB Dental:          Pulmonary: breath sounds clear to auscultation  (+) COPD (DAILY AND PRN INHALERS , NO O2 REQ.):  shortness of breath (EXERT):  current smoker (1-2 PPD)    (-) asthma and sleep apnea          Patient smoked on day of surgery. Cardiovascular:Negative CV ROS    (+) PURCELL:,     (-) pacemaker, hypertension, past MI, CAD, CABG/stent, dysrhythmias,  angina and  CHF      Rhythm: regular  Rate: normal           Beta Blocker:  Not on Beta Blocker         Neuro/Psych:   Negative Neuro/Psych ROS     (-) seizures, TIA, CVA and psychiatric history           GI/Hepatic/Renal: Neg GI/Hepatic/Renal ROS  (+) morbid obesity     (-) GERD, liver disease and no renal disease       Endo/Other:    (+) : arthritis:., no malignancy/cancer. (-) diabetes mellitus, hypothyroidism, hyperthyroidism, blood dyscrasia, no malignancy/cancer               Abdominal:   (+) obese,     Abdomen: soft. Vascular: negative vascular ROS. Anesthesia Plan      general     ASA 3       Induction: intravenous. MIPS: Postoperative opioids intended and Prophylactic antiemetics administered. Anesthetic plan and risks discussed with patient. Plan discussed with CRNA. This pre-anesthesia assessment may be used as a history and physical.    DOS STAFF ADDENDUM:    Pt seen and examined, chart reviewed (including anesthesia, drug and allergy history). No interval changes to history and physical examination. Anesthetic plan, risks, benefits, alternatives, and personnel involved discussed with patient. Patient verbalized an understanding and agrees to proceed.       Abby Hall MD  November 24, 2020  12:46 PM      Abby Hall MD   11/24/2020

## 2020-11-24 NOTE — ANESTHESIA POSTPROCEDURE EVALUATION
Department of Anesthesiology  Postprocedure Note    Patient: Omega Rodriges  MRN: [id number]  YOB: 1965  Date of evaluation: 11/24/2020  Time:  4:11 PM     Procedure Summary     Date:  11/24/20 Room / Location:  [address] [place] [address]    Anesthesia Start:  3458 Anesthesia Stop:  1764    Procedure:  EXAM UNDER ANESTHESIA VIDEO ARTHROSCOPY RIGHT SHOULDER, MINI-OPEN ROTATOR CUFF REPAIR, NEER ACROMIOPLASTY, DEBRIDEMENT LABRUM -EXPAREL- (Right Shoulder) Diagnosis:       Complete tear of right rotator cuff, unspecified whether traumatic      (ROTATOR CUFF TEAR RIGHT SHOULDER)    Surgeon:  Marely Domingo MD Responsible Provider:  Myriam Pruitt MD    Anesthesia Type:  general ASA Status:  3          Anesthesia Type: general    Morgan Phase I: Morgan Score: 9    Morgan Phase II: Morgan Score: 8    Last vitals: Reviewed and per EMR flowsheets.      Vitals:    11/24/20 1535 11/24/20 1540 11/24/20 1545 11/24/20 1600   BP: 119/60 (!) 84/43 (!) 95/44 97/67   Pulse: 81 82 79 83   Resp: 17 15 12 15   Temp:  97 °F (36.1 °C)  97 °F (36.1 °C)   TempSrc:       SpO2: 92% 92% 94% 97%   Weight:       Height:         Anesthesia Post Evaluation    Patient location during evaluation: bedside  Patient participation: complete - patient participated  Level of consciousness: awake and alert  Airway patency: patent  Nausea & Vomiting: no nausea  Complications: no  Cardiovascular status: hemodynamically stable  Respiratory status: acceptable  Hydration status: euvolemic

## 2020-11-25 NOTE — OP NOTE
[address] [address]                                OPERATIVE REPORT    PATIENT NAME: Kadie Ponce                         :        1965  MED REC NO:   [id number]                          ROOM:  ACCOUNT NO:   [de-identified]                           ADMIT DATE: 2020  PROVIDER:     Abdulaziz Rosario MD    DATE OF PROCEDURE:  2020    SERVICE:  Orthopedic Surgery. SURGEON:  Anna Brian MD    FIRST ASSISTANT:  FER Ryan    SECOND ASSISTANT:  Jacque Gauthier SA    PREOPERATIVE DIAGNOSES:  1. Complete tear of the supraspinatus tendon, right shoulder  approximately 2 cm in size. 2.  Rotator cuff impingement. POSTOPERATIVE DIAGNOSES:  1. Rotator cuff tear-supraspinatus-full thickness, right shoulder  involving approximately 2 cm supraspinatus extending into the very  anterior aspect of the infraspinatus. 2.  Chronic rotator cuff impingement, lateral arch stenosis. 3.  Frayed anterior and superior glenoid labrum and undersurface of the  rotator cuff. OPERATION PERFORMED:  1. Exam under anesthesia and video arthroscopy of the right shoulder. 2.  Mini open rotator cuff repair using two [employer]hew Healicoil  anchors and two [employer] and [employer] MultiFix anchors. 3.  Arthroscopic Neer acromioplasty. 4.  Arthroscopic debridement of the shoulder involving frayed anterior  and superior labrum and undersurface of the rotator cuff. ANESTHESIA:  General.    POSITION ON THE TABLE:  Left lateral decubitus with Vac-Pac, superficial  bony prominences carefully padded in axillary roll. Nine pounds of  longitudinal traction on the Ronci system, 30 degrees of abduction and  10 degrees of forward flexion. ESTIMATED BLOOD LOSS:  Less than 30 mL. INDICATIONS FOR SURGERY:  The patient is a 80-year-old woman who has  been struggling with ongoing right shoulder pain.   Her clinical  examination and MRI scan demonstrated a full-thickness rotator cuff  tear. She presents today for repair of rotator cuff and decompression  of the shoulder. We discussed the risks, benefits and potential  complications of the operation as well as normal rehabilitation for this  type of surgery. She realized the fairly lengthy rehab. She also  realized concerns regarding infection, deep vein thrombosis, pulmonary  embolism, arthrofibrosis, delayed rehabilitation, anesthetic  complications including death, cardiopulmonary issues, etc.  All  questions were answered. I did meet with this lady in the preanesthesia  holding area and answered any further questions and signed her shoulder. OPERATIVE PROCEDURE:  The patient was taken to the operating room and  placed on the operating table in the supine position. General  anesthesia was induced and maintained without difficulty. Exam under  anesthesia did not demonstrate any signs of adhesive capsulitis or  instability. The patient was then positioned as described above. The  shoulder was prepped and draped. A time-out was done. Routine arthroscopic portals were established and the glenohumeral joint  was entered. The glenohumeral articular surface was basically normal  with just some very mild arthritic changes. She had some fraying of the  superior and anterior labrum. These were debrided with a 4.0  full-radius resector. The full-thickness rotator cuff tear was also  readily evident. The undersurface of the rotator cuff was frayed that  was debrided as well. There were some attritional changes involving the  biceps, but only involved about 15% or 20%, so I did not perform a  biceps tenodesis. The instruments were removed from the joint and new arthroscopic portals  were established entering the subacromial space. A methodical  bursectomy was completed.   The patient had some lateral arch stenosis  and then the arthroscopic Neer acromioplasty was completed utilizing the  acromionectomy kamlesh removing about 6 mm of bone anteriorly and beveling  this back about 3 cm in this relatively small acromion process. The [facility] joint was examined. There was nothing that was necessary, here  was a minimal arthritic change. The rotator cuff was mobilized and debrided. The arthroscopic portion  of the procedure was discontinued at this point. Longitudinal incision was made. The deltoid was split. The retractor  was placed and then the excessive bursa was removed. The anatomic  footprint was readily evident of the supraspinatus. This was curetted  and rongeured to a good quality bleeding bed. At this point, two Healicoil anchors were placed in the anatomic bed and  then multiple sutures were placed through the rotator cuff advancing it  down on the prepared anatomic bed. Minimal tension was noted. The second row was then placed with MultiFix anchors crisscrossing the  sutures giving excellent fixation and a good three-dimensional  footprint. Everything was copiously irrigated. Exparel was placed all through an  open area in the subacromial region, but nothing in the joint. The patient was placed in a shoulder immobilization device postprocedure  in stable condition. She went to the recovery room postoperation.         Ivan Anne MD    D: 11/24/2020 15:13:59       T: 11/24/2020 23:13:22     [email or web address]  Job#: [id number]     Doc#: [id number]    CC:

## 2020-11-27 ENCOUNTER — OFFICE VISIT (OUTPATIENT)
Dept: ORTHOPEDIC SURGERY | Age: 55
End: 2020-11-27

## 2020-11-27 VITALS — HEIGHT: 58 IN | WEIGHT: 195 LBS | BODY MASS INDEX: 40.93 KG/M2

## 2020-11-27 PROCEDURE — 99024 POSTOP FOLLOW-UP VISIT: CPT | Performed by: PHYSICIAN ASSISTANT

## 2020-11-27 RX ORDER — CEPHALEXIN 750 MG/1
750 CAPSULE ORAL 3 TIMES DAILY
Qty: 30 CAPSULE | Refills: 0 | Status: CANCELLED | OUTPATIENT
Start: 2020-11-27 | End: 2020-12-07

## 2020-11-27 RX ORDER — OXYCODONE HYDROCHLORIDE AND ACETAMINOPHEN 5; 325 MG/1; MG/1
1 TABLET ORAL EVERY 6 HOURS PRN
Qty: 28 TABLET | Refills: 0 | Status: SHIPPED | OUTPATIENT
Start: 2020-11-27 | End: 2020-12-11

## 2020-12-11 RX ORDER — OXYCODONE HYDROCHLORIDE AND ACETAMINOPHEN 5; 325 MG/1; MG/1
TABLET ORAL
Qty: 28 TABLET | Refills: 0 | Status: SHIPPED | OUTPATIENT
Start: 2020-12-11 | End: 2020-12-18

## 2020-12-14 ENCOUNTER — TELEPHONE (OUTPATIENT)
Dept: ORTHOPEDIC SURGERY | Age: 55
End: 2020-12-14

## 2020-12-18 ENCOUNTER — HOSPITAL ENCOUNTER (OUTPATIENT)
Dept: PHYSICAL THERAPY | Age: 55
Setting detail: THERAPIES SERIES
Discharge: HOME | End: 2020-12-18
Payer: COMMERCIAL

## 2020-12-18 ENCOUNTER — OFFICE VISIT (OUTPATIENT)
Dept: ORTHOPEDIC SURGERY | Age: 55
End: 2020-12-18

## 2020-12-18 VITALS — HEIGHT: 58 IN | WEIGHT: 195 LBS | BODY MASS INDEX: 40.93 KG/M2

## 2020-12-18 PROCEDURE — 97161 PT EVAL LOW COMPLEX 20 MIN: CPT

## 2020-12-18 PROCEDURE — 97110 THERAPEUTIC EXERCISES: CPT

## 2020-12-18 PROCEDURE — 99024 POSTOP FOLLOW-UP VISIT: CPT | Performed by: PHYSICIAN ASSISTANT

## 2020-12-18 RX ORDER — OXYCODONE HYDROCHLORIDE AND ACETAMINOPHEN 5; 325 MG/1; MG/1
1 TABLET ORAL EVERY 6 HOURS PRN
Qty: 28 TABLET | Refills: 0 | Status: SHIPPED | OUTPATIENT
Start: 2020-12-18 | End: 2020-12-29

## 2020-12-18 NOTE — PLAN OF CARE
[facility], [address] [place] [address]  Phone: [phone]  Fax [phone]     Physical Therapy Certification    Dear Referring Practitioner: FER Aly,    We had the pleasure of evaluating the following patient for physical therapy services at [address] [place] [address]. A summary of our findings can be found in the initial assessment below. This includes our plan of care. If you have any questions or concerns regarding these findings, please do not hesitate to contact me at the office phone number checked above.   Thank you for the referral.       Physician Signature:_______________________________Date:__________________  By signing above (or electronic signature), therapists plan is approved by physician    Patient: Tory Xie   : 1965   MRN: [id number]  Referring Physician: Referring Practitioner: FER Aly      Evaluation Date: 2020      Medical Diagnosis Information:  Diagnosis: Z98.890 s/p arthroscopy of the shoulder   Treatment Diagnosis: M25.511 Right shoulder pain                                         Insurance information: PT Insurance Information: [place]    Precautions/ Contra-indications: s/p R mini open RC repair, arthroscopy, Neer acromioplasty and debridement DOS 2020; use 3/3/3 Protocol       C-SSRS Triggered by Intake questionnaire (Past 2 wk assessment):   [x] No, Questionnaire did not trigger screening.   [] Yes, Patient intake triggered further evaluation      [] C-SSRS Screening completed  [] PCP notified via Plan of Care  [] Emergency services notified     Latex Allergy:  [x]NO      []YES  Preferred Language for Healthcare:   [x]English       []other:    SUBJECTIVE: Patient stated complaint: Patient presents with R shoulder pain and weakness following R shoulder arthroscopy, mini open rotator cuff repair, Neer acromioplasty and debridement. She is taking pain medications for her pain as little as possible and is icing regularly. She has been weaned out of her sling but notes she has not been told any specifics about what she is and is not allowed to do. She has been receiving help from her  for self-care and things around the home. She is Right hand dominant. Relevant Medical History:RA   Functional Disability Index: QuickDASH: 33; 50% disability     Pain Scale: 3-5/10  Easing factors: rest, ice  Provocative factors: reaching, getting ready in the mornings. Type: []Constant   [x]Intermittent  []Radiating []Localized []other:     Numbness/Tingling: None    Occupation/School: Unemployed     Living Status/Prior Level of Function: Independent with ADLs and IADLs, lives at home with her  who is able to help her around the house and with getting ready. OBJECTIVE:     CERV ROM     Cervical Flexion WFL    Cervical Extension     Cervical SB     Cervical rotation          ROM Left Right   Shoulder Flex  80 deg pain free    Shoulder Abd  75 deg   Shoulder ER  0 deg   Shoulder IR  50 deg                  Strength  Left Right   Shoulder Flex  NT   Shoulder Scap  NT   Shoulder ER  NT   Shoulder IR  NT               Reflexes/Sensation:     [x]Dermatomes/Myotomes intact    [x]Reflexes equal and normal bilaterally   []Other:    Joint mobility: R GHJ   [x]Normal    []Hypo   []Hyper    Palpation: mild ttp of R biceps    Functional Mobility/Transfers: WFL    Posture: WFL    Bandages/Dressings/Incisions: healthy signs of tissue healing    Gait: (include devices/WB status): WNL     Orthopedic Special Tests: N/A                       [x] Patient history, allergies, meds reviewed. Medical chart reviewed. See intake form. Review Of Systems (ROS):  [x]Performed Review of systems (Integumentary, CardioPulmonary, Neurological) by intake and observation. Intake form has been scanned into medical record.  Patient has been instructed to RC/scapular/core strength and neuromuscular control   []other:      Functional Activity Limitations (from functional questionnaire and intake)   [x]Reduced ability to tolerate prolonged functional positions   []Reduced ability or difficulty with changes of positions or transfers between positions   []Reduced ability to maintain good posture and demonstrate good body mechanics with sitting, bending, and lifting   [] Reduced ability or tolerance with driving and/or computer work   [x]Reduced ability to sleep   [x]Reduced ability to perform lifting, reaching, carrying tasks   [x]Reduced ability to tolerate impact through UE   [x]Reduced ability to reach behind back   [x]Reduced ability to  or hold objects   [x]Reduced ability to throw or toss an object   []other:    Participation Restrictions   [x]Reduced participation in self care activities   [x]Reduced participation in home management activities   [x]Reduced participation in work activities   [x]Reduced participation in social activities. []Reduced participation in sport/recreation activities. Classification:   [x]Signs/symptoms consistent with post-surgical status including decreased ROM, strength and function.   []Signs/symptoms consistent with joint sprain/strain   []Signs/symptoms consistent with shoulder impingement   []Signs/symptoms consistent with shoulder/elbow/wrist tendinopathy   []Signs/symptoms consistent with Rotator cuff tear   []Signs/symptoms consistent with labral tear   []Signs/symptoms consistent with postural dysfunction    []Signs/symptoms consistent with Glenohumeral IR Deficit - <45 degrees   []Signs/symptoms consistent with facet dysfunction of cervical/thoracic spine    []Signs/symptoms consistent with pathology which may benefit from Dry needling     []other:     Prognosis/Rehab Potential:      []Excellent   [x]Good    []Fair   []Poor    Tolerance of evaluation/treatment:    []Excellent   [x]Good    []Fair   []Poor  Physical Progressing: [] Met: [] Not Met: [] Adjusted    Therapist goals for Patient:   Short Term Goals: To be achieved in: 2 weeks  1. Independent in HEP and progression per patient tolerance, in order to prevent re-injury. [] Progressing: [] Met: [] Not Met: [] Adjusted  2. Patient will have a decrease in pain to facilitate improvement in movement, function, and ADLs as indicated by Functional Deficits. [] Progressing: [] Met: [] Not Met: [] Adjusted    Long Term Goals: To be achieved in: 6 weeks  1. Disability index score of 25% or less for the [facility] to assist with reaching prior level of function. [] Progressing: [] Met: [] Not Met: [] Adjusted  2. Patient will demonstrate increased AROM to 160 deg FE of R shoulder to allow for proper joint functioning as indicated by patients Functional Deficits. [] Progressing: [] Met: [] Not Met: [] Adjusted  3. Patient will demonstrate an increase in Strength to 4+/5 of R RC to allow for proper functional mobility as indicated by patients Functional Deficits. [] Progressing: [] Met: [] Not Met: [] Adjusted  4. Patient will return to 95% of functional activities without increased symptoms or restriction. [] Progressing: [] Met: [] Not Met: [] Adjusted  5.  Patient will be able to raise her R arm OH without pain (patient specific functional goal)    [] Progressing: [] Met: [] Not Met: [] Adjusted       Electronically signed by:  Dioni Ortiz, PT, DPT, [facility] [id number]

## 2020-12-18 NOTE — PROGRESS NOTES
OPERATION PERFORMED:11/24/2020  1.  Exam under anesthesia and video arthroscopy of the right shoulder. 2.  Mini open rotator cuff repair using two [employer] Healicoil  anchors and two [employer] and [employer] MultiFix anchors. 3.  Arthroscopic Neer acromioplasty. 4.  Arthroscopic debridement of the shoulder involving frayed anterior  and superior labrum and undersurface of the rotator cuff. History of present illness: The patient returns today after Right shoulder arthroscopy performed on 11/24/2020. Pain control has been satisfactory with oral medications. She has been doing some gentle range of motion exercises at home and is going to start therapy today. Physical examination:  Incisions are well-healed with no signs of infection. Neurovascular exam is intact. X-rays: 3 x-rays of the Right shoulder reveal evidence of near acromioplastywith no acute bony abnormalities     Assessment/plan: The patient is doing well after shoulder arthroscopy. I have recommended continued therapy for both range of motion and strength.   Patient will follow-up in 4 weeks.    present

## 2020-12-18 NOTE — FLOWSHEET NOTE
[facility],  [address] [place]  Phone: [phone]  Fax [phone]        Date:  2020    Patient Name:  Sapna Whiteside    :  1965  MRN: [id number]  Restrictions/Precautions:    Medical/Treatment Diagnosis Information:  · Diagnosis: Z98.890 s/p arthroscopy of the shoulder  · Treatment Diagnosis: M25.511 Right shoulder pain  Insurance/Certification information:  PT Insurance Information: [address] [place] [address]  Physician Information:  Referring Practitioner: FER Thomas  Has the plan of care been signed (Y/N):        []  Yes  [x]  No     Date of Patient follow up with Physician: 2021      Is this a Progress Report:     []  Yes  [x]  No        If Yes:  Date Range for reporting period:  Beginning 20  Ending     Progress report will be due (10 Rx or 30 days whichever is less):        Recertification will be due (POC Duration  / 90 days whichever is less): 20     Visit # Insurance Allowable Auth Required   In-person 1 30 []  Yes []  No    Telehealth     []  Yes []  No    Total            Functional Scale: QuickDASH: 33; 50% disability  Date assessed:  20      Therapy Diagnosis/Practice Pattern: I      Number of Comorbidities:  []0     [x]1-2    []3+     Latex Allergy:  [x]NO      []YES  Preferred Language for Healthcare:   [x]English       []other:      Pain level:  3-5/10     SUBJECTIVE:  See eval    OBJECTIVE: See eval   Observation:    Test measurements:      RESTRICTIONS/PRECAUTIONS:  s/p R mini open RC repair, arthroscopy, Neer acromioplasty and debridement DOS 2020; Use 3/3/3 Protocol; Beginning 12/15 pt can transition to [facility]    Exercises/Interventions:     Therapeutic Ex (59230) Sets/sec Reps Notes/CUES   scap retraction 5\"Hx10     Seated UT stretch 30\"x3     Flexion table slides  10\"x10     Seated AAROM ER w dowel 10\"x10     Elbow AROM 15x      Towel squeeze  15x Pt education on current condition, POC, expectations for therapy, and HEP as well as precautions and continuation of icing at home 8'           Manual Intervention (38079)                                          NMR re-education (91893)   CUES NEEDED                                                         Therapeutic Activity (64866)                                                Therapeutic Exercise and NMR EXR  [x] (40699) Provided verbal/tactile cueing for activities related to strengthening, flexibility, endurance, ROM  for improvements in scapular, scapulothoracic and UE control with self care, reaching, carrying, lifting, house/yardwork, driving/computer work.    [] (73729) Provided verbal/tactile cueing for activities related to improving balance, coordination, kinesthetic sense, posture, motor skill, proprioception  to assist with  scapular, scapulothoracic and UE control with self care, reaching, carrying, lifting, house/yardwork, driving/computer work. Therapeutic Activities:    [] (50902 or 81265) Provided verbal/tactile cueing for activities related to improving balance, coordination, kinesthetic sense, posture, motor skill, proprioception and motor activation to allow for proper function of scapular, scapulothoracic and UE control with self care, carrying, lifting, driving/computer work.      Home Exercise Program:    [x] (70921) Reviewed/Progressed HEP activities related to strengthening, flexibility, endurance, ROM of scapular, scapulothoracic and UE control with self care, reaching, carrying, lifting, house/yardwork, driving/computer work  [] (33631) Reviewed/Progressed HEP activities related to improving balance, coordination, kinesthetic sense, posture, motor skill, proprioception of scapular, scapulothoracic and UE control with self care, reaching, carrying, lifting, house/yardwork, driving/computer work      Manual Treatments:  PROM / STM / Oscillations-Mobs:  G-I, II, III, IV (Jie, Inf., Post.)  [] (20690) Provided manual therapy to mobilize soft tissue/joints of cervical/CT, scapular GHJ and UE for the purpose of modulating pain, promoting relaxation,  increasing ROM, reducing/eliminating soft tissue swelling/inflammation/restriction, improving soft tissue extensibility and allowing for proper ROM for normal function with self care, reaching, carrying, lifting, house/yardwork, driving/computer work    Modalities:  CPx10'    Charges:  Timed Code Treatment Minutes: 23'   Total Treatment Minutes: 39'          [x] EVAL (LOW) 05421   [] EVAL (MOD) 82627   [] EVAL (HIGH) 96873   [] RE-EVAL     [x] MX(55058) x 2    [] IONTO  [] NMR (58013) x     [] VASO  [] Manual (52409) x      [] Other:  [] TA x      [] Mech Traction (01329)  [] ES(attended) (90271)      [] ES (un) (11593):     GOALS:  Patient stated goal: Patient will be able to style her hair without R shoulder pain. [] Progressing: [] Met: [] Not Met: [] Adjusted    Therapist goals for Patient:   Short Term Goals: To be achieved in: 2 weeks  1. Independent in HEP and progression per patient tolerance, in order to prevent re-injury. [] Progressing: [] Met: [] Not Met: [] Adjusted  2. Patient will have a decrease in pain to facilitate improvement in movement, function, and ADLs as indicated by Functional Deficits. [] Progressing: [] Met: [] Not Met: [] Adjusted    Long Term Goals: To be achieved in: 6 weeks  1. Disability index score of 25% or less for the [facility] to assist with reaching prior level of function. [] Progressing: [] Met: [] Not Met: [] Adjusted  2. Patient will demonstrate increased AROM to 160 deg FE of R shoulder to allow for proper joint functioning as indicated by patients Functional Deficits. [] Progressing: [] Met: [] Not Met: [] Adjusted  3. Patient will demonstrate an increase in Strength to 4+/5 of R RC to allow for proper functional mobility as indicated by patients Functional Deficits.    [] Progressing: [] Met: [] Not Met: [] Adjusted  4. Patient will return to 95% of functional activities without increased symptoms or restriction. [] Progressing: [] Met: [] Not Met: [] Adjusted  5. Patient will be able to raise her R arm OH without pain (patient specific functional goal)    [] Progressing: [] Met: [] Not Met: [] Adjusted       Progression Towards Functional goals:  [] Patient is progressing as expected towards functional goals listed. [] Progression is slowed due to complexities listed. [] Progression has been slowed due to co-morbidities. [x] Plan just implemented, too soon to assess goals progression  [] Other:     ASSESSMENT:  See eval    Overall Progression Towards Functional goals/ Treatment Progress Update:  [] Patient is progressing as expected towards functional goals listed. [] Progression is slowed due to complexities/Impairments listed. [] Progression has been slowed due to co-morbidities.   [x] Plan just implemented, too soon to assess goals progression <30days   [] Goals require adjustment due to lack of progress  [] Patient is not progressing as expected and requires additional follow up with physician  [] Other    Prognosis for POC: [x] Good [] Fair  [] Poor      Patient requires continued skilled intervention: [x] Yes  [] No    Treatment/Activity Tolerance:  [x] Patient able to complete treatment  [] Patient limited by fatigue  [] Patient limited by pain    [] Patient limited by other medical complications  [] Other:         Return to Play: (if applicable)   []  Stage 1: Intro to Strength   []  Stage 2: Return to Run and Strength   []  Stage 3: Return to Jump and Strength   []  Stage 4: Dynamic Strength and Agility   []  Stage 5: Sport Specific Training     []  Ready to Return to Play, Meets All Above Stages   []  Not Ready for Return to Sports   Comments:                               PLAN: See eval  [] Continue per plan of care [] Alter current plan (see comments above)  [x] Plan of care initiated [] Hold pending MD visit [] Discharge      Electronically signed by:  Mimi Mckinley, PT, DPT, OCS [id number]     Note: If patient does not return for scheduled/ recommended follow up visits, this note will serve as a discharge from care along with most recent update on progress.

## 2020-12-22 ENCOUNTER — HOSPITAL ENCOUNTER (OUTPATIENT)
Dept: PHYSICAL THERAPY | Age: 55
Setting detail: THERAPIES SERIES
Discharge: HOME | End: 2020-12-22
Payer: COMMERCIAL

## 2020-12-22 PROCEDURE — 97110 THERAPEUTIC EXERCISES: CPT

## 2020-12-22 PROCEDURE — G0283 ELEC STIM OTHER THAN WOUND: HCPCS

## 2020-12-22 PROCEDURE — 97140 MANUAL THERAPY 1/> REGIONS: CPT

## 2020-12-22 NOTE — FLOWSHEET NOTE
[facility],  [address] [place]  Phone: [phone]  Fax [phone]        Date:  2020    Patient Name:  Eden Kang    :  1965  MRN: [id number]  Restrictions/Precautions:    Medical/Treatment Diagnosis Information:  · Diagnosis: Z98.890 s/p arthroscopy of the shoulder  · Treatment Diagnosis: M25.511 Right shoulder pain  Insurance/Certification information:  PT Insurance Information: [employer]  Physician Information:  Referring Practitioner: FER Ryan  Has the plan of care been signed (Y/N):        []  Yes  [x]  No     Date of Patient follow up with Physician: 2021      Is this a Progress Report:     []  Yes  [x]  No        If Yes:  Date Range for reporting period:  Beginning 20  Ending     Progress report will be due (10 Rx or 30 days whichever is less): 81       Recertification will be due (POC Duration  / 90 days whichever is less): 20     Visit # Insurance Allowable Auth Required   In-person 2 30 []  Yes []  No    Telehealth     []  Yes []  No    Total            Functional Scale: QuickDASH: 33; 50% disability  Date assessed:  20      Therapy Diagnosis/Practice Pattern: I      Number of Comorbidities:  []0     [x]1-2    []3+     Latex Allergy:  [x]NO      []YES  Preferred Language for Healthcare:   [x]English       []other:      Pain level:  5/10     SUBJECTIVE:  Patient reports some soreness in her shoulder following her exercises and has been icing following. She is still taking the Flexeril.      OBJECTIVE: See eval   Observation: ttp of R UT, SS, IS, biceps     Test measurements:  R GHJ ROM: Limited ER to approx 0 deg in scapular plane; flx to approx 100 deg, abd to 75 deg, IR to belly    RESTRICTIONS/PRECAUTIONS:  s/p R mini open RC repair, arthroscopy, Neer acromioplasty and debridement DOS 2020; Use 3/3/3 Protocol; Beginning 12/15 pt can transition to [facility]    Exercises/Interventions:     Therapeutic Ex (24049) Sets/sec Reps Notes/CUES   scap retraction 5\"Hx10     Seated UT stretch 30\"x3     Flexion table slides  10\"x10     Seated AAROM ER w dowel 10\"x10     Elbow AROM 20x       15x            ABD table slides 10\"x10                 Pt education on current condition, POC, expectations for therapy, and HEP as well as precautions and continuation of icing at home             Manual Intervention (81370)      STM, TrPR to R RC, GrI-II GHJ mobs, PROM R shoulder all directions 20'                                   NMR re-education (63839)   CUES NEEDED                                                         Therapeutic Activity (06131)                                                Therapeutic Exercise and NMR EXR  [x] (83566) Provided verbal/tactile cueing for activities related to strengthening, flexibility, endurance, ROM  for improvements in scapular, scapulothoracic and UE control with self care, reaching, carrying, lifting, house/yardwork, driving/computer work.    [] (07882) Provided verbal/tactile cueing for activities related to improving balance, coordination, kinesthetic sense, posture, motor skill, proprioception  to assist with  scapular, scapulothoracic and UE control with self care, reaching, carrying, lifting, house/yardwork, driving/computer work. Therapeutic Activities:    [] (17804 or 69945) Provided verbal/tactile cueing for activities related to improving balance, coordination, kinesthetic sense, posture, motor skill, proprioception and motor activation to allow for proper function of scapular, scapulothoracic and UE control with self care, carrying, lifting, driving/computer work.      Home Exercise Program:    [x] (26524) Reviewed/Progressed HEP activities related to strengthening, flexibility, endurance, ROM of scapular, scapulothoracic and UE control with self care, reaching, carrying, lifting, house/yardwork, driving/computer work  [] (37294) Reviewed/Progressed HEP activities related to improving balance, coordination, kinesthetic sense, posture, motor skill, proprioception of scapular, scapulothoracic and UE control with self care, reaching, carrying, lifting, house/yardwork, driving/computer work      Manual Treatments:  PROM / STM / Oscillations-Mobs:  G-I, II, III, IV (PA's, Inf., Post.)  [] (15024) Provided manual therapy to mobilize soft tissue/joints of cervical/CT, scapular GHJ and UE for the purpose of modulating pain, promoting relaxation,  increasing ROM, reducing/eliminating soft tissue swelling/inflammation/restriction, improving soft tissue extensibility and allowing for proper ROM for normal function with self care, reaching, carrying, lifting, house/yardwork, driving/computer work    Modalities:  IFC + CPx10'    Charges:  Timed Code Treatment Minutes: 40'   Total Treatment Minutes: 50'          [] EVAL (LOW) 455 1011   [] EVAL (MOD) 40896   [] EVAL (HIGH) 16197   [] RE-EVAL     [x] HA(66029) x 2    [] IONTO  [] NMR (59767) x     [] VASO  [x] Manual (90355) x 1     [] Other:  [] TA x      [] Mech Traction (09718)  [] ES(attended) (62176)      [x] ES (un) (25979):     GOALS:  Patient stated goal: Patient will be able to style her hair without R shoulder pain. [] Progressing: [] Met: [] Not Met: [] Adjusted    Therapist goals for Patient:   Short Term Goals: To be achieved in: 2 weeks  1. Independent in HEP and progression per patient tolerance, in order to prevent re-injury. [] Progressing: [] Met: [] Not Met: [] Adjusted  2. Patient will have a decrease in pain to facilitate improvement in movement, function, and ADLs as indicated by Functional Deficits. [] Progressing: [] Met: [] Not Met: [] Adjusted    Long Term Goals: To be achieved in: 6 weeks  1. Disability index score of 25% or less for the [facility] to assist with reaching prior level of function. [] Progressing: [] Met: [] Not Met: [] Adjusted  2.  Patient will demonstrate increased AROM to 160 deg FE of R shoulder to allow for proper joint functioning as indicated by patients Functional Deficits. [] Progressing: [] Met: [] Not Met: [] Adjusted  3. Patient will demonstrate an increase in Strength to 4+/5 of R RC to allow for proper functional mobility as indicated by patients Functional Deficits. [] Progressing: [] Met: [] Not Met: [] Adjusted  4. Patient will return to 95% of functional activities without increased symptoms or restriction. [] Progressing: [] Met: [] Not Met: [] Adjusted  5. Patient will be able to raise her R arm OH without pain (patient specific functional goal)    [] Progressing: [] Met: [] Not Met: [] Adjusted       Progression Towards Functional goals:  [] Patient is progressing as expected towards functional goals listed. [] Progression is slowed due to complexities listed. [] Progression has been slowed due to co-morbidities. [x] Plan just implemented, too soon to assess goals progression  [] Other:     ASSESSMENT:  Patient had sig ttp of her R shoulder mm and RC with difficulty tolerating PROM this date. Following MT and joint mobs she had mild improvement in tolerance to ROM. She is not wearing her sling at this time and has not been for the last week as she was advised she did not have to at this point unless in the community. She has most of her ROM limitations in a capsular pattern- watch for development of adhesive capsulitis in upcoming appts. Overall Progression Towards Functional goals/ Treatment Progress Update:  [] Patient is progressing as expected towards functional goals listed. [] Progression is slowed due to complexities/Impairments listed. [] Progression has been slowed due to co-morbidities.   [x] Plan just implemented, too soon to assess goals progression <30days   [] Goals require adjustment due to lack of progress  [] Patient is not progressing as expected and requires additional follow up with physician  [] Other    Prognosis for POC: [x] Good [] Fair  [] Poor      Patient requires continued skilled intervention: [x] Yes  [] No    Treatment/Activity Tolerance:  [x] Patient able to complete treatment  [] Patient limited by fatigue  [] Patient limited by pain    [] Patient limited by other medical complications  [] Other:         Return to Play: (if applicable)   []  Stage 1: Intro to Strength   []  Stage 2: Return to Run and Strength   []  Stage 3: Return to Jump and Strength   []  Stage 4: Dynamic Strength and Agility   []  Stage 5: Sport Specific Training     []  Ready to Return to Play, Meets All Above Stages   []  Not Ready for Return to Sports   Comments:                               PLAN: Cont R shoulder ROM and strengthening. [x] Continue per plan of care [] Alter current plan (see comments above)  [] Plan of care initiated [] Hold pending MD visit [] Discharge      Electronically signed by:  Geovani Woodson, PT, DPT, [facility] [id number]     Note: If patient does not return for scheduled/ recommended follow up visits, this note will serve as a discharge from care along with most recent update on progress.

## 2020-12-23 RX ORDER — CYCLOBENZAPRINE HCL 10 MG
10 TABLET ORAL 2 TIMES DAILY PRN
Qty: 40 TABLET | Refills: 0 | Status: SHIPPED | OUTPATIENT
Start: 2020-12-23 | End: 2021-01-14

## 2020-12-28 ENCOUNTER — HOSPITAL ENCOUNTER (OUTPATIENT)
Dept: PHYSICAL THERAPY | Age: 55
Setting detail: THERAPIES SERIES
Discharge: HOME | End: 2020-12-28
Payer: COMMERCIAL

## 2020-12-28 PROCEDURE — 97140 MANUAL THERAPY 1/> REGIONS: CPT | Performed by: PHYSICAL THERAPIST

## 2020-12-28 PROCEDURE — 97110 THERAPEUTIC EXERCISES: CPT | Performed by: PHYSICAL THERAPIST

## 2020-12-28 PROCEDURE — G0283 ELEC STIM OTHER THAN WOUND: HCPCS | Performed by: PHYSICAL THERAPIST

## 2020-12-28 NOTE — FLOWSHEET NOTE
[facility], [address]  Phone: [phone]  Fax [phone]        Date:  2020    Patient Name:  Sapna Whiteside    :  1965  MRN: [id number]  Restrictions/Precautions:    Medical/Treatment Diagnosis Information:  · Diagnosis: Z98.890 s/p arthroscopy of the shoulder  · Treatment Diagnosis: M25.511 Right shoulder pain  Insurance/Certification information:  PT Insurance Information: [address] [place] [address]  Physician Information:  Referring Practitioner: FER Thomas  Has the plan of care been signed (Y/N):        []  Yes  [x]  No     Date of Patient follow up with Physician: 2021      Is this a Progress Report:     []  Yes  [x]  No        If Yes:  Date Range for reporting period:  Beginning 20  Ending     Progress report will be due (10 Rx or 30 days whichever is less):        Recertification will be due (POC Duration  / 90 days whichever is less): 20     Visit # Insurance Allowable Auth Required   In-person 3 30 []  Yes []  No    Telehealth     []  Yes []  No    Total            Functional Scale: QuickDASH: 33; 50% disability  Date assessed:  20      Therapy Diagnosis/Practice Pattern: I      Number of Comorbidities:  []0     [x]1-2    []3+     Latex Allergy:  [x]NO      []YES  Preferred Language for Healthcare:   [x]English       []other:      Pain level:  5/10     SUBJECTIVE:  Patient reports  soreness in her shoulder following her exercises and has been icing following. She is still wanting to take the Flexeril at night.      OBJECTIVE: See eval   Observation: ttp of R UT, SS, IS, biceps    Test measurements:      RESTRICTIONS/PRECAUTIONS:  s/p R mini open RC repair, arthroscopy, Neer acromioplasty and debridement DOS 2020; Use 3/3/3 Protocol; Beginning 12/15 pt can transition to [facility]    Exercises/Interventions:   [address]: [place] [address] see scanned forms    Therapeutic Ex (33473) Sets/sec Reps Notes/CUES   scap retraction 5\"Hx10      X HEP    X HEP    X HEP    X HEP    X HEP       ABD table slides 5\"x10  X HEP                     SB FLX rolls on table 10x10\"     Supine cane ER @ 30/60 ABD 10x5\"  X 12/28   Seated EOB shoulder ER 10x10\"  X 12/28   No money standing R/L stagger stance 5x5\"  ea  X 12/28   Pt education on current condition, POC, expectations for therapy, and HEP as well as precautions and continuation of icing at home 5'            Manual Intervention (53074)      STM, TrPR to R RC and bicep and UT, GrI-II GHJ mobs, PROM R shoulder all directions 15'                                   NMR re-education (76413)   CUES NEEDED                                                         Therapeutic Activity (68949)                                                Therapeutic Exercise and NMR EXR  [x] (83014) Provided verbal/tactile cueing for activities related to strengthening, flexibility, endurance, ROM  for improvements in scapular, scapulothoracic and UE control with self care, reaching, carrying, lifting, house/yardwork, driving/computer work.    [] (40054) Provided verbal/tactile cueing for activities related to improving balance, coordination, kinesthetic sense, posture, motor skill, proprioception  to assist with  scapular, scapulothoracic and UE control with self care, reaching, carrying, lifting, house/yardwork, driving/computer work. Therapeutic Activities:    [] (76556 or 27553) Provided verbal/tactile cueing for activities related to improving balance, coordination, kinesthetic sense, posture, motor skill, proprioception and motor activation to allow for proper function of scapular, scapulothoracic and UE control with self care, carrying, lifting, driving/computer work.      Home Exercise Program:    [x] (52193) Reviewed/Progressed HEP activities related to strengthening, flexibility, endurance, ROM of scapular, scapulothoracic and UE control with self care, reaching, carrying, lifting, house/yardwork, driving/computer work  [] (97116) Reviewed/Progressed HEP activities related to improving balance, coordination, kinesthetic sense, posture, motor skill, proprioception of scapular, scapulothoracic and UE control with self care, reaching, carrying, lifting, house/yardwork, driving/computer work      Manual Treatments:  PROM / STM / Oscillations-Mobs:  G-I, II, III, IV (PA's, Inf., Post.)  [x] (79789) Provided manual therapy to mobilize soft tissue/joints of cervical/CT, scapular GHJ and UE for the purpose of modulating pain, promoting relaxation,  increasing ROM, reducing/eliminating soft tissue swelling/inflammation/restriction, improving soft tissue extensibility and allowing for proper ROM for normal function with self care, reaching, carrying, lifting, house/yardwork, driving/computer work    Modalities:  IFC + CPx10'    Charges:  Timed Code Treatment Minutes: 40'   Total Treatment Minutes: 50'          [] EVAL (LOW) 455 1011   [] EVAL (MOD) 17481   [] EVAL (HIGH) 16484   [] RE-EVAL     [x] WM(99332) x 2    [] IONTO  [] NMR (51148) x     [] VASO  [x] Manual (08325) x 1     [] Other:  [] TA x      [] Mech Traction (01604)  [] ES(attended) (18225)      [x] ES (un) (58866):     GOALS:  Patient stated goal: Patient will be able to style her hair without R shoulder pain. [x] Progressing: [] Met: [] Not Met: [] Adjusted    Therapist goals for Patient:   Short Term Goals: To be achieved in: 2 weeks  1. Independent in HEP and progression per patient tolerance, in order to prevent re-injury. [x] Progressing: [] Met: [] Not Met: [] Adjusted  2. Patient will have a decrease in pain to facilitate improvement in movement, function, and ADLs as indicated by Functional Deficits. [x] Progressing: [] Met: [] Not Met: [] Adjusted    Long Term Goals: To be achieved in: 6 weeks  1. Disability index score of 25% or less for the [facility] to assist with reaching prior level of function. [] Progressing: [] Met: [] Not Met: [] Adjusted  2. Patient will demonstrate increased AROM to 160 deg FE of R shoulder to allow for proper joint functioning as indicated by patients Functional Deficits. [] Progressing: [] Met: [] Not Met: [] Adjusted  3. Patient will demonstrate an increase in Strength to 4+/5 of R RC to allow for proper functional mobility as indicated by patients Functional Deficits. [] Progressing: [] Met: [] Not Met: [] Adjusted  4. Patient will return to 95% of functional activities without increased symptoms or restriction. [] Progressing: [] Met: [] Not Met: [] Adjusted  5. Patient will be able to raise her R arm OH without pain (patient specific functional goal)    [] Progressing: [] Met: [] Not Met: [] Adjusted       Progression Towards Functional goals:  [] Patient is progressing as expected towards functional goals listed. [] Progression is slowed due to complexities listed. [] Progression has been slowed due to co-morbidities. [x] Plan just implemented, too soon to assess goals progression  [] Other:     ASSESSMENT:  Pt has discharged her sling. She presents with significant restrictions in ER ROM but was able to perform the stretches in the clinic. She did not tolerate PROM well this visit with significant guarding. She has most of her ROM limitations in a capsular pattern- watch for development of adhesive capsulitis in upcoming appts. Overall Progression Towards Functional goals/ Treatment Progress Update:  [] Patient is progressing as expected towards functional goals listed. [] Progression is slowed due to complexities/Impairments listed. [] Progression has been slowed due to co-morbidities.   [x] Plan just implemented, too soon to assess goals progression <30days   [] Goals require adjustment due to lack of progress  [] Patient is not progressing as expected and requires additional follow up with physician  [] Other    Prognosis for POC: [x] Good [] Fair  [] Poor      Patient requires continued skilled intervention: [x] Yes  [] No    Treatment/Activity Tolerance:  [x] Patient able to complete treatment  [] Patient limited by fatigue  [] Patient limited by pain    [] Patient limited by other medical complications  [] Other:             PLAN: Cont R shoulder ROM and scapular strengthening. [x] Continue per plan of care [] Alter current plan (see comments above)  [] Plan of care initiated [] Hold pending MD visit [] Discharge      Electronically signed by:  Isiah Mallory, PT, [id number]    Note: If patient does not return for scheduled/ recommended follow up visits, this note will serve as a discharge from care along with most recent update on progress.

## 2020-12-29 RX ORDER — OXYCODONE HYDROCHLORIDE AND ACETAMINOPHEN 5; 325 MG/1; MG/1
1 TABLET ORAL EVERY 8 HOURS PRN
Qty: 21 TABLET | Refills: 0 | Status: SHIPPED | OUTPATIENT
Start: 2020-12-29 | End: 2021-01-05

## 2021-01-05 ENCOUNTER — HOSPITAL ENCOUNTER (OUTPATIENT)
Dept: PHYSICAL THERAPY | Age: 56
Setting detail: THERAPIES SERIES
Discharge: HOME | End: 2021-01-05
Payer: COMMERCIAL

## 2021-01-05 PROCEDURE — G0283 ELEC STIM OTHER THAN WOUND: HCPCS

## 2021-01-05 PROCEDURE — 97140 MANUAL THERAPY 1/> REGIONS: CPT

## 2021-01-05 PROCEDURE — 97110 THERAPEUTIC EXERCISES: CPT

## 2021-01-05 NOTE — FLOWSHEET NOTE
[facility], [address] [place]  Phone: [phone]  Fax [phone]        Date:  2021    Patient Name:  Traci Hamilton    :  1965  MRN: [id number]  Restrictions/Precautions:    Medical/Treatment Diagnosis Information:  · Diagnosis: Z98.890 s/p arthroscopy of the shoulder  · Treatment Diagnosis: M25.511 Right shoulder pain  Insurance/Certification information:  PT Insurance Information: [facility] [address] [place] [address]  Physician Information:  Referring Practitioner: FER Arreola  Has the plan of care been signed (Y/N):        []  Yes  [x]  No     Date of Patient follow up with Physician: 2021      Is this a Progress Report:     []  Yes  [x]  No        If Yes:  Date Range for reporting period:  Beginning 20  Ending     Progress report will be due (10 Rx or 30 days whichever is less):        Recertification will be due (POC Duration  / 90 days whichever is less): 20     Visit # Insurance Allowable Auth Required   In-person 4 30 []  Yes []  No    Telehealth     []  Yes []  No    Total            Functional Scale: QuickDASH: 33; 50% disability  Date assessed:  20      Therapy Diagnosis/Practice Pattern: I      Number of Comorbidities:  []0     [x]1-2    []3+     Latex Allergy:  [x]NO      []YES  Preferred Language for Healthcare:   [x]English       []other:      Pain level:  5/10     SUBJECTIVE:  Patient reports aching in her R shoulder that is worse this morning than usual. She contributes it to the weather. She notes she has been cooking a lot which requires repetitive use of her R arm.       OBJECTIVE: See eval   Observation: ttp of R UT, SS, IS, biceps    Test measurements:       PT Practice Pattern: I  Co morbidities: 1-2  surgical   INITIAL VISIT  CURRENT VISIT   PAIN  3-5/10 5/10   FUNCTIONAL SCALE QuickDASH (33) 50% disability      ROM R shoulder flx 80 deg 95 deg      abd 75 deg 85 deg ER 0 deg       IR 50 deg                     STRENGHT (MMT)                                                             RESTRICTIONS/PRECAUTIONS:  s/p R mini open RC repair, arthroscopy, Neer acromioplasty and debridement DOS 11/24/2020; Use 3/3/3 Protocol; Beginning 12/15 pt can transition to [facility]    Exercises/Interventions:   Thuan Palacios: [id number] see scanned forms    Therapeutic Ex (65840) Sets/sec Reps Notes/CUES   scap retraction 5\"Hx10      X HEP   Flexion table slides  10\"x10  X HEP    X HEP    X HEP    X HEP       ABD table slides 5\"x10  X HEP   Pulleys scaption 5\"Hx10  Pain free                 10x10\"     Supine cane ER @ 30/60 ABD 10x5\"  X 12/28   Seated EOB shoulder ER  10x10\"  X 12/28   No money standing R/L stagger stance 5x5\"  ea  X 12/28               Pt education on current condition, POC, expectations for therapy, and HEP as well as precautions and continuation of icing at home 5'            Manual Intervention (99511)      STM, TrPR to R RC and bicep and UT, GrI-II GHJ mobs, PROM R shoulder all directions 15'  Difficulty tolerating PROM into Flx, abd, and ER                                  NMR re-education (14359)   CUES NEEDED                                                         Therapeutic Activity (78116)                                                Therapeutic Exercise and NMR EXR  [x] (07609) Provided verbal/tactile cueing for activities related to strengthening, flexibility, endurance, ROM  for improvements in scapular, scapulothoracic and UE control with self care, reaching, carrying, lifting, house/yardwork, driving/computer work.    [] (41938) Provided verbal/tactile cueing for activities related to improving balance, coordination, kinesthetic sense, posture, motor skill, proprioception  to assist with  scapular, scapulothoracic and UE control with self care, reaching, carrying, lifting, house/yardwork, driving/computer work.     Therapeutic Activities:    [] (08624 or 32430) Provided verbal/tactile cueing for activities related to improving balance, coordination, kinesthetic sense, posture, motor skill, proprioception and motor activation to allow for proper function of scapular, scapulothoracic and UE control with self care, carrying, lifting, driving/computer work. Home Exercise Program:    [x] (37111) Reviewed/Progressed HEP activities related to strengthening, flexibility, endurance, ROM of scapular, scapulothoracic and UE control with self care, reaching, carrying, lifting, house/yardwork, driving/computer work  [] (70830) Reviewed/Progressed HEP activities related to improving balance, coordination, kinesthetic sense, posture, motor skill, proprioception of scapular, scapulothoracic and UE control with self care, reaching, carrying, lifting, house/yardwork, driving/computer work      Manual Treatments:  PROM / STM / Oscillations-Mobs:  G-I, II, III, IV (PA's, Inf., Post.)  [x] (52091) Provided manual therapy to mobilize soft tissue/joints of cervical/CT, scapular GHJ and UE for the purpose of modulating pain, promoting relaxation,  increasing ROM, reducing/eliminating soft tissue swelling/inflammation/restriction, improving soft tissue extensibility and allowing for proper ROM for normal function with self care, reaching, carrying, lifting, house/yardwork, driving/computer work    Modalities:  IFC + CPx15'    Charges:  Timed Code Treatment Minutes: 38'   Total Treatment Minutes: 48'          [] EVAL (LOW) 95906   [] EVAL (MOD) 89384   [] EVAL (HIGH) 19491   [] RE-EVAL     [x] ZA(66467) x 2    [] IONTO  [] NMR (02419) x     [] VASO  [x] Manual (46752) x 1     [] Other:  [] TA x      [] Mech Traction (59202)  [] ES(attended) (99667)      [x] ES (un) (79170):     GOALS:  Patient stated goal: Patient will be able to style her hair without R shoulder pain. [x] Progressing: [] Met: [] Not Met: [] Adjusted    Therapist goals for Patient:   Short Term Goals:  To be achieved in: 2 weeks  1. Independent in HEP and progression per patient tolerance, in order to prevent re-injury. [x] Progressing: [] Met: [] Not Met: [] Adjusted  2. Patient will have a decrease in pain to facilitate improvement in movement, function, and ADLs as indicated by Functional Deficits. [x] Progressing: [] Met: [] Not Met: [] Adjusted    Long Term Goals: To be achieved in: 6 weeks  1. Disability index score of 25% or less for the [facility] to assist with reaching prior level of function. [] Progressing: [] Met: [] Not Met: [] Adjusted  2. Patient will demonstrate increased AROM to 160 deg FE of R shoulder to allow for proper joint functioning as indicated by patients Functional Deficits. [] Progressing: [] Met: [] Not Met: [] Adjusted  3. Patient will demonstrate an increase in Strength to 4+/5 of R RC to allow for proper functional mobility as indicated by patients Functional Deficits. [] Progressing: [] Met: [] Not Met: [] Adjusted  4. Patient will return to 95% of functional activities without increased symptoms or restriction. [] Progressing: [] Met: [] Not Met: [] Adjusted  5. Patient will be able to raise her R arm OH without pain (patient specific functional goal)    [] Progressing: [] Met: [] Not Met: [] Adjusted       Progression Towards Functional goals:  [] Patient is progressing as expected towards functional goals listed. [] Progression is slowed due to complexities listed. [] Progression has been slowed due to co-morbidities. [x] Plan just implemented, too soon to assess goals progression  [] Other:     ASSESSMENT:  Patient presents with significant restrictions in ER ROM but was able to perform the stretches in the clinic. She did not tolerate PROM well this visit with significant guarding and pain at end ranges of flx, abd, and ER though the PT was not yet into notable tissue resistance before pt was experiencing pain.       She has most of her ROM limitations in a capsular pattern- watch for development of adhesive capsulitis in upcoming appts. Overall Progression Towards Functional goals/ Treatment Progress Update:  [] Patient is progressing as expected towards functional goals listed. [] Progression is slowed due to complexities/Impairments listed. [] Progression has been slowed due to co-morbidities. [x] Plan just implemented, too soon to assess goals progression <30days   [] Goals require adjustment due to lack of progress  [] Patient is not progressing as expected and requires additional follow up with physician  [] Other    Prognosis for POC: [x] Good [] Fair  [] Poor      Patient requires continued skilled intervention: [x] Yes  [] No    Treatment/Activity Tolerance:  [x] Patient able to complete treatment  [] Patient limited by fatigue  [] Patient limited by pain    [] Patient limited by other medical complications  [] Other:             PLAN: Cont R shoulder ROM and scapular strengthening. [x] Continue per plan of care [] Alter current plan (see comments above)  [] Plan of care initiated [] Hold pending MD visit [] Discharge      Electronically signed by:  Mejia Gutierrez, PT, DPT, \[facility]\"" [id number]     Note: If patient does not return for scheduled/ recommended follow up visits, this note will serve as a discharge from care along with most recent update on progress.

## 2021-01-07 ENCOUNTER — APPOINTMENT (OUTPATIENT)
Dept: PHYSICAL THERAPY | Age: 56
End: 2021-01-07
Payer: COMMERCIAL

## 2021-01-08 ENCOUNTER — HOSPITAL ENCOUNTER (OUTPATIENT)
Dept: PHYSICAL THERAPY | Age: 56
Setting detail: THERAPIES SERIES
Discharge: HOME | End: 2021-01-08
Payer: COMMERCIAL

## 2021-01-08 PROCEDURE — 97110 THERAPEUTIC EXERCISES: CPT

## 2021-01-08 PROCEDURE — 97140 MANUAL THERAPY 1/> REGIONS: CPT

## 2021-01-08 PROCEDURE — G0283 ELEC STIM OTHER THAN WOUND: HCPCS

## 2021-01-08 NOTE — FLOWSHEET NOTE
[facility],  [address] [place]  Phone: [phone]  Fax [phone]        Date:  2021    Patient Name:  Estrada Lucero    :  1965  MRN: [id number]  Restrictions/Precautions:    Medical/Treatment Diagnosis Information:  · Diagnosis: Z98.890 s/p arthroscopy of the shoulder  · Treatment Diagnosis: M25.511 Right shoulder pain  Insurance/Certification information:  PT Insurance Information: Zita Crespo  Physician Information:  Referring Practitioner: FER Cardona  Has the plan of care been signed (Y/N):        []  Yes  [x]  No     Date of Patient follow up with Physician: 2021      Is this a Progress Report:     []  Yes  [x]  No        If Yes:  Date Range for reporting period:  Beginning 20  Ending     Progress report will be due (10 Rx or 30 days whichever is less): 99       Recertification will be due (POC Duration  / 90 days whichever is less): 20     Visit # Insurance Allowable Auth Required   In-person 5 30 []  Yes []  No    Telehealth     []  Yes []  No    Total            Functional Scale: QuickDASH: 33; 50% disability  Date assessed:  20      Therapy Diagnosis/Practice Pattern: I      Number of Comorbidities:  []0     [x]1-2    []3+     Latex Allergy:  [x]NO      []YES  Preferred Language for Healthcare:   [x]English       []other:      Pain level:  2-3/10     SUBJECTIVE:  Patient notes her shoulder is not as painful this morning. She reports no issues with her HEP.         OBJECTIVE: See eval   Observation: ttp of R UT, SS, IS, biceps    Test measurements:       PT Practice Pattern: I  Co morbidities: 1-2  surgical   INITIAL VISIT  CURRENT VISIT   PAIN  3-5/10 2-3/10   FUNCTIONAL SCALE QuickDASH (33) 50% disability      ROM R shoulder flx 80 deg 125 deg      abd 75 deg NT deg      ER 0 deg       IR 50 deg                     STRENGHT (MMT) RESTRICTIONS/PRECAUTIONS:  s/p R mini open RC repair, arthroscopy, Neer acromioplasty and debridement DOS 11/24/2020; Use 3/3/3 Protocol; Beginning 12/15 pt can transition to [facility]; Progress to AROM on 1/5/21     Exercises/Interventions:   Zainab Crump: [id number] see scanned forms    Therapeutic Ex (37762) Sets/sec Reps Notes/CUES   scap retraction 5\"Hx10      X HEP   Flexion table slides  10\"x10  X HEP    X HEP    X HEP    X HEP       ABD table slides 5\"x10  X HEP   Pulleys scaption 10\"Hx10  Pain free                 10x10\"     Supine cane ER @ 30/60 ABD 10x5\"  X 12/28   Seated EOB shoulder ER  10x10\"  X 12/28   No money standing R/L stagger stance 5x5\"  ea  X 12/28         Standing rows 2x10  YTB   Shoulder extension 2x10  YTB               Pt education on current condition, POC, expectations for therapy, and HEP as well as precautions and continuation of icing at home 5'            Manual Intervention (18232)      STM, TrPR to R RC and bicep and UT, GrI-II GHJ mobs, PROM R shoulder all directions 13'  Difficulty tolerating PROM into Flx, abd, and ER    IASTM to R UT, LS, periscapular mm 3'     STJ mobs and MWM for protraction, retraction, upward rot 2'                       NMR re-education (07055)   CUES NEEDED   Scapular wall slides w towel 5\"Hx12   Fatigued and sore at 12 reps                                                   Therapeutic Activity (91612)                                                Therapeutic Exercise and NMR EXR  [x] (38062) Provided verbal/tactile cueing for activities related to strengthening, flexibility, endurance, ROM  for improvements in scapular, scapulothoracic and UE control with self care, reaching, carrying, lifting, house/yardwork, driving/computer work.    [] (18160) Provided verbal/tactile cueing for activities related to improving balance, coordination, kinesthetic sense, posture, motor skill, proprioception  to assist with  scapular, scapulothoracic and UE control with self care, reaching, carrying, lifting, house/yardwork, driving/computer work. Therapeutic Activities:    [] (70629 or 03397) Provided verbal/tactile cueing for activities related to improving balance, coordination, kinesthetic sense, posture, motor skill, proprioception and motor activation to allow for proper function of scapular, scapulothoracic and UE control with self care, carrying, lifting, driving/computer work.      Home Exercise Program:    [x] (91827) Reviewed/Progressed HEP activities related to strengthening, flexibility, endurance, ROM of scapular, scapulothoracic and UE control with self care, reaching, carrying, lifting, house/yardwork, driving/computer work  [] (33019) Reviewed/Progressed HEP activities related to improving balance, coordination, kinesthetic sense, posture, motor skill, proprioception of scapular, scapulothoracic and UE control with self care, reaching, carrying, lifting, house/yardwork, driving/computer work      Manual Treatments:  PROM / STM / Oscillations-Mobs:  G-I, II, III, IV (PA's, Inf., Post.)  [x] (71942) Provided manual therapy to mobilize soft tissue/joints of cervical/CT, scapular GHJ and UE for the purpose of modulating pain, promoting relaxation,  increasing ROM, reducing/eliminating soft tissue swelling/inflammation/restriction, improving soft tissue extensibility and allowing for proper ROM for normal function with self care, reaching, carrying, lifting, house/yardwork, driving/computer work    Modalities:  IFC + CPx15'    Charges:  Timed Code Treatment Minutes: 40'   Total Treatment Minutes: 54'          [] EVAL (LOW) 26524   [] EVAL (MOD) 34486   [] EVAL (HIGH) 59531   [] RE-EVAL     [x] XF(34474) x 2    [] IONTO  [] NMR (25488) x     [] VASO  [x] Manual (80509) x 1     [] Other:  [] TA x      [] Mech Traction (68804)  [] ES(attended) (11645)      [x] ES (un) (45828):     GOALS:  Patient stated goal: Patient will be able to style her hair without R shoulder pain. [x] Progressing: [] Met: [] Not Met: [] Adjusted    Therapist goals for Patient:   Short Term Goals: To be achieved in: 2 weeks  1. Independent in HEP and progression per patient tolerance, in order to prevent re-injury. [x] Progressing: [] Met: [] Not Met: [] Adjusted  2. Patient will have a decrease in pain to facilitate improvement in movement, function, and ADLs as indicated by Functional Deficits. [x] Progressing: [] Met: [] Not Met: [] Adjusted    Long Term Goals: To be achieved in: 6 weeks  1. Disability index score of 25% or less for the [facility] to assist with reaching prior level of function. [] Progressing: [] Met: [] Not Met: [] Adjusted  2. Patient will demonstrate increased AROM to 160 deg FE of R shoulder to allow for proper joint functioning as indicated by patients Functional Deficits. [] Progressing: [] Met: [] Not Met: [] Adjusted  3. Patient will demonstrate an increase in Strength to 4+/5 of R RC to allow for proper functional mobility as indicated by patients Functional Deficits. [] Progressing: [] Met: [] Not Met: [] Adjusted  4. Patient will return to 95% of functional activities without increased symptoms or restriction. [] Progressing: [] Met: [] Not Met: [] Adjusted  5. Patient will be able to raise her R arm OH without pain (patient specific functional goal)    [] Progressing: [] Met: [] Not Met: [] Adjusted       Progression Towards Functional goals:  [] Patient is progressing as expected towards functional goals listed. [] Progression is slowed due to complexities listed. [] Progression has been slowed due to co-morbidities. [x] Plan just implemented, too soon to assess goals progression  [] Other:     ASSESSMENT:  Patient presents with significant restrictions in ER ROM but was able to perform the stretches in the clinic.  She had improved tolerance of PROM and AAROM this visit but continues with significant guarding and pain at end ranges of flx, abd, and ER. Pt was given updated HEP to include scapular strengthening and AAROM ex. She has most of her ROM limitations in a capsular pattern- watch for development of adhesive capsulitis in upcoming appts. Overall Progression Towards Functional goals/ Treatment Progress Update:  [] Patient is progressing as expected towards functional goals listed. [] Progression is slowed due to complexities/Impairments listed. [] Progression has been slowed due to co-morbidities. [x] Plan just implemented, too soon to assess goals progression <30days   [] Goals require adjustment due to lack of progress  [] Patient is not progressing as expected and requires additional follow up with physician  [] Other    Prognosis for POC: [x] Good [] Fair  [] Poor      Patient requires continued skilled intervention: [x] Yes  [] No    Treatment/Activity Tolerance:  [x] Patient able to complete treatment  [] Patient limited by fatigue  [] Patient limited by pain    [] Patient limited by other medical complications  [] Other:             PLAN: Cont R shoulder ROM and scapular strengthening. [x] Continue per plan of care [] Alter current plan (see comments above)  [] Plan of care initiated [] Hold pending MD visit [] Discharge      Electronically signed by:  Esperanza Echols, PT, DPT, OCS [id number]     Note: If patient does not return for scheduled/ recommended follow up visits, this note will serve as a discharge from care along with most recent update on progress.

## 2021-01-12 ENCOUNTER — HOSPITAL ENCOUNTER (OUTPATIENT)
Dept: PHYSICAL THERAPY | Age: 56
Setting detail: THERAPIES SERIES
Discharge: HOME | End: 2021-01-12
Payer: COMMERCIAL

## 2021-01-12 PROCEDURE — G0283 ELEC STIM OTHER THAN WOUND: HCPCS

## 2021-01-12 PROCEDURE — 97110 THERAPEUTIC EXERCISES: CPT

## 2021-01-12 PROCEDURE — 97140 MANUAL THERAPY 1/> REGIONS: CPT

## 2021-01-12 NOTE — OP NOTE
[facility],  [address] [place]  Phone: [phone]  Fax [phone]        Date:  2021    Patient Name:  Michael Galaviz    :  1965  MRN: [id number]  Restrictions/Precautions:    Medical/Treatment Diagnosis Information:  · Diagnosis: Z98.890 s/p arthroscopy of the shoulder  · Treatment Diagnosis: M25.511 Right shoulder pain  Insurance/Certification information:  PT Insurance Information: Boni Paul  Physician Information:  Referring Practitioner: FER Giraldo  Has the plan of care been signed (Y/N):        []  Yes  [x]  No     Date of Patient follow up with Physician: 2021      Is this a Progress Report:     []  Yes  [x]  No        If Yes:  Date Range for reporting period:  Beginning 20  Ending     Progress report will be due (10 Rx or 30 days whichever is less):        Recertification will be due (POC Duration  / 90 days whichever is less): 20     Visit # Insurance Allowable Auth Required   In-person 6 30 []  Yes []  No    Telehealth     []  Yes []  No    Total            Functional Scale: QuickDASH: 33; 50% disability  Date assessed:  20      Therapy Diagnosis/Practice Pattern: I      Number of Comorbidities:  []0     [x]1-2    []3+     Latex Allergy:  [x]NO      []YES  Preferred Language for Healthcare:   [x]English       []other:      Pain level:  0/10     SUBJECTIVE:  Patient reports she feels her shoulder is getting better. She now has greater ease with pulling her hair back.         OBJECTIVE: See eval   Observation: ttp of R UT, SS, IS, biceps    Test measurements:       PT Practice Pattern: I  Co morbidities: 1-2  surgical   INITIAL VISIT  CURRENT VISIT  21   PAIN  3-5/10 0/10   FUNCTIONAL SCALE QuickDASH (33) 50% disability   NT   ROM R shoulder flx 80 deg 135 deg      abd 75 deg 110  Deg pain      ER 0 deg  20 deg     IR 50 deg 50 deg STRENGHT (MMT)                                                             RESTRICTIONS/PRECAUTIONS:  s/p R mini open RC repair, arthroscopy, Neer acromioplasty and debridement DOS 11/24/2020; Use 3/3/3 Protocol; Beginning 12/15 pt can transition to [facility]; Progress to AROM on 1/5/21       Therapeutic Exercise and NMR EXR  [x] (82474) Provided verbal/tactile cueing for activities related to strengthening, flexibility, endurance, ROM  for improvements in scapular, scapulothoracic and UE control with self care, reaching, carrying, lifting, house/yardwork, driving/computer work.    [] (36419) Provided verbal/tactile cueing for activities related to improving balance, coordination, kinesthetic sense, posture, motor skill, proprioception  to assist with  scapular, scapulothoracic and UE control with self care, reaching, carrying, lifting, house/yardwork, driving/computer work. Therapeutic Activities:    [] (34766 or 41069) Provided verbal/tactile cueing for activities related to improving balance, coordination, kinesthetic sense, posture, motor skill, proprioception and motor activation to allow for proper function of scapular, scapulothoracic and UE control with self care, carrying, lifting, driving/computer work.      Home Exercise Program:    [x] (01687) Reviewed/Progressed HEP activities related to strengthening, flexibility, endurance, ROM of scapular, scapulothoracic and UE control with self care, reaching, carrying, lifting, house/yardwork, driving/computer work  [] (81171) Reviewed/Progressed HEP activities related to improving balance, coordination, kinesthetic sense, posture, motor skill, proprioception of scapular, scapulothoracic and UE control with self care, reaching, carrying, lifting, house/yardwork, driving/computer work      Manual Treatments:  PROM / STM / Oscillations-Mobs:  G-I, II, III, IV (PA's, Inf., Post.)  [x] (86242) Provided manual therapy to mobilize soft tissue/joints of cervical/CT, scapular GHJ and UE for the purpose of modulating pain, promoting relaxation,  increasing ROM, reducing/eliminating soft tissue swelling/inflammation/restriction, improving soft tissue extensibility and allowing for proper ROM for normal function with self care, reaching, carrying, lifting, house/yardwork, driving/computer work    Modalities:  IFC + CPx15'    Charges:  Timed Code Treatment Minutes: 40'   Total Treatment Minutes: 54'          [] EVAL (LOW) 76257   [] EVAL (MOD) 23482   [] EVAL (HIGH) 88542   [] RE-EVAL     [x] FT(73808) x 2    [] IONTO  [] NMR (59248) x     [] VASO  [x] Manual (84096) x 1     [] Other:  [] TA x      [] Mech Traction (75153)  [] ES(attended) (79248)      [x] ES (un) (61193):     GOALS:  Patient stated goal: Patient will be able to style her hair without R shoulder pain. [x] Progressing: [] Met: [] Not Met: [] Adjusted    Therapist goals for Patient:   Short Term Goals: To be achieved in: 2 weeks  1. Independent in HEP and progression per patient tolerance, in order to prevent re-injury. [x] Progressing: [x] Met: [] Not Met: [] Adjusted  2. Patient will have a decrease in pain to facilitate improvement in movement, function, and ADLs as indicated by Functional Deficits. [x] Progressing: [] Met: [] Not Met: [] Adjusted    Long Term Goals: To be achieved in: 6 weeks  1. Disability index score of 25% or less for the [facility] to assist with reaching prior level of function. [] Progressing: [] Met: [] Not Met: [] Adjusted  2. Patient will demonstrate increased AROM to 160 deg FE of R shoulder to allow for proper joint functioning as indicated by patients Functional Deficits. [x] Progressing: [] Met: [] Not Met: [] Adjusted  3. Patient will demonstrate an increase in Strength to 4+/5 of R RC to allow for proper functional mobility as indicated by patients Functional Deficits. [x] Progressing: [] Met: [] Not Met: [] Adjusted  4.  Patient will return to 95% of functional activities without increased symptoms or restriction. [x] Progressing: [] Met: [] Not Met: [] Adjusted  5. Patient will be able to raise her R arm OH without pain (patient specific functional goal)    [x] Progressing: [] Met: [] Not Met: [] Adjusted       Progression Towards Functional goals:  [] Patient is progressing as expected towards functional goals listed. [] Progression is slowed due to complexities listed. [] Progression has been slowed due to co-morbidities. [x] Plan just implemented, too soon to assess goals progression  [] Other:     ASSESSMENT:  Patient presents with significant restrictions in ER ROM but was able to perform the stretches in the clinic. She had improved tolerance of PROM and AAROM this visit but continues with significant guarding and pain at end ranges of flx, abd, and ER. Pt was given updated HEP to include scapular strengthening and AAROM ex. She has most of her ROM limitations in a capsular pattern- watch for development of adhesive capsulitis in upcoming appts. Overall Progression Towards Functional goals/ Treatment Progress Update:  [] Patient is progressing as expected towards functional goals listed. [] Progression is slowed due to complexities/Impairments listed. [] Progression has been slowed due to co-morbidities. [x] Plan just implemented, too soon to assess goals progression <30days   [] Goals require adjustment due to lack of progress  [] Patient is not progressing as expected and requires additional follow up with physician  [] Other    Prognosis for POC: [x] Good [] Fair  [] Poor      Patient requires continued skilled intervention: [x] Yes  [] No    Treatment/Activity Tolerance:  [x] Patient able to complete treatment  [] Patient limited by fatigue  [] Patient limited by pain    [] Patient limited by other medical complications  [] Other:             PLAN: Cont R shoulder ROM and scapular strengthening.    [x] Continue per plan of care [] Alter current plan (see comments above)  [] Plan of care initiated [] Hold pending MD visit [] Discharge      Electronically signed by:  Trace Oneal, PT, DPT, [facility] [id number]     Note: If patient does not return for scheduled/ recommended follow up visits, this note will serve as a discharge from care along with most recent update on progress.

## 2021-01-14 RX ORDER — CYCLOBENZAPRINE HCL 10 MG
TABLET ORAL
Qty: 40 TABLET | Refills: 0 | Status: SHIPPED | OUTPATIENT
Start: 2021-01-14 | End: 2021-03-04 | Stop reason: SDUPTHER

## 2021-01-15 ENCOUNTER — APPOINTMENT (OUTPATIENT)
Dept: PHYSICAL THERAPY | Age: 56
End: 2021-01-15
Payer: COMMERCIAL

## 2021-01-18 ENCOUNTER — OFFICE VISIT (OUTPATIENT)
Dept: ORTHOPEDIC SURGERY | Age: 56
End: 2021-01-18

## 2021-01-18 VITALS — BODY MASS INDEX: 40.93 KG/M2 | WEIGHT: 195 LBS | HEIGHT: 58 IN

## 2021-01-18 DIAGNOSIS — Z98.890 S/P ROTATOR CUFF REPAIR: ICD-10-CM

## 2021-01-18 DIAGNOSIS — Z98.890 S/P ARTHROSCOPY OF SHOULDER: Primary | ICD-10-CM

## 2021-01-18 PROCEDURE — 99024 POSTOP FOLLOW-UP VISIT: CPT | Performed by: PHYSICIAN ASSISTANT

## 2021-01-18 NOTE — PROGRESS NOTES
Chief Complaint   Patient presents with    Post-Op Check     RIGHT SHOULDER [address] 11/24/20     DATE OF PROCEDURE:  11/24/2020     SERVICE:  Orthopedic Surgery.     SURGEON:  Mary Shannon MD     FIRST ASSISTANT:  Augusto Baumgarten, PA     SECOND ASSISTANT:  Daja Johnson SA     PREOPERATIVE DIAGNOSES:  1. Complete tear of the supraspinatus tendon, right shoulder  approximately 2 cm in size. 2.  Rotator cuff impingement.     POSTOPERATIVE DIAGNOSES:  1. Rotator cuff tear-supraspinatus-full thickness, right shoulder  involving approximately 2 cm supraspinatus extending into the very  anterior aspect of the infraspinatus. 2.  Chronic rotator cuff impingement, lateral arch stenosis. 3.  Frayed anterior and superior glenoid labrum and undersurface of the  rotator cuff.     OPERATION PERFORMED:  1. Exam under anesthesia and video arthroscopy of the right shoulder. 2.  Mini open rotator cuff repair using two [employer] and [employer]  anchors and two [employer] and [employer] Multi[employer] anchors. 3.  Arthroscopic Neer acromioplasty. 4.  Arthroscopic debridement of the shoulder involving frayed anterior  and superior labrum and undersurface of the rotator cuff. History of present illness: The patient returns today for their first postoperative visit after right shoulder arthroscopy and mini open rotator cuff repair performed on 11/24/2020. He is been working with physical therapy and progressing well. She reports improvement in her range of motion and pain. Physical examination: Inspection reveals expected swelling. Incisions are well-healed with no signs of infection. Range of motion limited by pain and swelling as expected. Strength was not assessed today. Neurovascular exam is intact. Assessment/plan: The patient is doing well after shoulder arthroscopy and rotator cuff repair. She is progressing well with physical therapy. I wrote I recommended continued aggressive range of motion and progressing to strengthening as appropriate by physical therapy. She will see us back again in 1 month if her symptoms do not continue to improve with physical therapy. Ashish Moore, [facility]    This dictation was performed with a verbal recognition program [facility]) and it was checked for errors. It is possible that there are still dictated errors within this office note. If so, please bring any errors to my attention for an addendum. All efforts were made to ensure that this office note is accurate.

## 2021-01-19 ENCOUNTER — HOSPITAL ENCOUNTER (OUTPATIENT)
Dept: PHYSICAL THERAPY | Age: 56
Setting detail: THERAPIES SERIES
Discharge: HOME | End: 2021-01-19
Payer: COMMERCIAL

## 2021-01-19 NOTE — FLOWSHEET NOTE
[facility],  [address]        Physical Therapy  Cancellation/No-show Note  Patient Name:  Mayur Apodaca  :  1965   Date:  2021  Cancelled visits to date: 1  No-shows to date: 0    For today's appointment patient:  [x]  Cancelled  []  Rescheduled appointment  []  No-show     Reason given by patient:  []  Patient ill  []  Conflicting appointment  [x]  No transportation    []  Conflict with work  []  No reason given  []  Other:     Comments:      Electronically signed by:  Aba Alexander, PT, DPT, [id number]

## 2021-01-22 ENCOUNTER — HOSPITAL ENCOUNTER (OUTPATIENT)
Dept: PHYSICAL THERAPY | Age: 56
Setting detail: THERAPIES SERIES
Discharge: HOME | End: 2021-01-22
Payer: COMMERCIAL

## 2021-01-22 PROCEDURE — 97110 THERAPEUTIC EXERCISES: CPT

## 2021-01-22 PROCEDURE — 97112 NEUROMUSCULAR REEDUCATION: CPT

## 2021-01-22 PROCEDURE — 97140 MANUAL THERAPY 1/> REGIONS: CPT

## 2021-01-22 NOTE — PROGRESS NOTES
work.    [] (31674) Provided verbal/tactile cueing for activities related to improving balance, coordination, kinesthetic sense, posture, motor skill, proprioception  to assist with  scapular, scapulothoracic and UE control with self care, reaching, carrying, lifting, house/yardwork, driving/computer work. Therapeutic Activities:    [] (84900 or 86529) Provided verbal/tactile cueing for activities related to improving balance, coordination, kinesthetic sense, posture, motor skill, proprioception and motor activation to allow for proper function of scapular, scapulothoracic and UE control with self care, carrying, lifting, driving/computer work.      Home Exercise Program:    [x] (56658) Reviewed/Progressed HEP activities related to strengthening, flexibility, endurance, ROM of scapular, scapulothoracic and UE control with self care, reaching, carrying, lifting, house/yardwork, driving/computer work  [] (99190) Reviewed/Progressed HEP activities related to improving balance, coordination, kinesthetic sense, posture, motor skill, proprioception of scapular, scapulothoracic and UE control with self care, reaching, carrying, lifting, house/yardwork, driving/computer work      Manual Treatments:  PROM / STM / Oscillations-Mobs:  G-I, II, III, IV (PA's, Inf., Post.)  [x] (72969) Provided manual therapy to mobilize soft tissue/joints of cervical/CT, scapular GHJ and UE for the purpose of modulating pain, promoting relaxation,  increasing ROM, reducing/eliminating soft tissue swelling/inflammation/restriction, improving soft tissue extensibility and allowing for proper ROM for normal function with self care, reaching, carrying, lifting, house/yardwork, driving/computer work    Modalities:  I CPx10'    Charges:  Timed Code Treatment Minutes: 39'   Total Treatment Minutes: 54'          [] EVAL (LOW) 97798   [] EVAL (MOD) 97643   [] EVAL (HIGH) 41107   [] RE-EVAL     [x] UW(11738) x 1    [] IONTO  [x] NMR (66399) x 1    [] VASO  [x] Manual (49734) x 1     [] Other:  [] TA x      [] Mech Traction (43720)  [] ES(attended) (70184)      [] ES (un) (04048):     GOALS:  Patient stated goal: Patient will be able to style her hair without R shoulder pain. - she is now able to wash and brush her hair but not able to style it independently   [x] Progressing: [] Met: [] Not Met: [] Adjusted    Therapist goals for Patient:   Short Term Goals: To be achieved in: 2 weeks  1. Independent in HEP and progression per patient tolerance, in order to prevent re-injury. [x] Progressing: [x] Met: [] Not Met: [] Adjusted  2. Patient will have a decrease in pain to facilitate improvement in movement, function, and ADLs as indicated by Functional Deficits. [x] Progressing: [] Met: [] Not Met: [] Adjusted    Long Term Goals: To be achieved in: 6 weeks  1. Disability index score of 25% or less for the [facility] to assist with reaching prior level of function. [x] Progressing: [] Met: [] Not Met: [] Adjusted  2. Patient will demonstrate increased AROM to 160 deg FE of R shoulder to allow for proper joint functioning as indicated by patients Functional Deficits. [x] Progressing: [] Met: [] Not Met: [] Adjusted  3. Patient will demonstrate an increase in Strength to 4+/5 of R RC to allow for proper functional mobility as indicated by patients Functional Deficits. [x] Progressing: [] Met: [] Not Met: [] Adjusted  4. Patient will return to 95% of functional activities without increased symptoms or restriction. [x] Progressing: [] Met: [] Not Met: [] Adjusted  5. Patient will be able to raise her R arm OH without pain (patient specific functional goal)    [x] Progressing: [] Met: [] Not Met: [] Adjusted       Progression Towards Functional goals:  [] Patient is progressing as expected towards functional goals listed. [x] Progression is slowed due to complexities listed. [] Progression has been slowed due to co-morbidities.   [] Plan just implemented, too by:  Lance Kumar, PT, DPT, \[facility]\"" [id number]     Note: If patient does not return for scheduled/ recommended follow up visits, this note will serve as a discharge from care along with most recent update on progress.

## 2021-01-25 ENCOUNTER — HOSPITAL ENCOUNTER (OUTPATIENT)
Dept: PHYSICAL THERAPY | Age: 56
Setting detail: THERAPIES SERIES
Discharge: HOME | End: 2021-01-25
Payer: COMMERCIAL

## 2021-01-25 PROCEDURE — 97110 THERAPEUTIC EXERCISES: CPT

## 2021-01-25 PROCEDURE — G0283 ELEC STIM OTHER THAN WOUND: HCPCS

## 2021-01-25 PROCEDURE — 97140 MANUAL THERAPY 1/> REGIONS: CPT

## 2021-01-25 PROCEDURE — 97112 NEUROMUSCULAR REEDUCATION: CPT

## 2021-01-25 NOTE — FLOWSHEET NOTE
[facility], [address]  Phone: [phone]  Fax [phone]    Physical Therapy Daily Treatment Note        Date:  2021    Patient Name:  Vishal Llamas    :  1965  MRN: [id number]  Restrictions/Precautions:    Medical/Treatment Diagnosis Information:  · Diagnosis: Z98.890 s/p arthroscopy of the shoulder  · Treatment Diagnosis: M25.511 Right shoulder pain  Insurance/Certification information:  PT Insurance Information: [facility] [address] [place] [address]  Physician Information:  Referring Practitioner: [facility] [place]  Has the plan of care been signed (Y/N):        []  Yes  [x]  No     Date of Patient follow up with Physician: 2021      Is this a Progress Report:     [x]  Yes  []  No        If Yes:  Date Range for reporting period:  Beginning 20  Ending 21    Progress report will be due (10 Rx or 30 days whichever is less): 20 Next progress on 67      Recertification will be due (POC Duration  / 90 days whichever is less): 20     Visit # Insurance Allowable Auth Required   In-person 8 30 []  Yes []  No    Telehealth     []  Yes []  No    Total            Functional Scale: QuickDASH: 33; 50% disability  Date assessed:  20      Therapy Diagnosis/Practice Pattern: I      Number of Comorbidities:  []0     [x]1-2    []3+     Latex Allergy:  [x]NO      []YES  Preferred Language for Healthcare:   [x]English       []other:      Pain level:  0/10     SUBJECTIVE:  Patient reports her shoulder has been feeling better and she is having more ease with sleeping, dressing herself, and putting her jacket on with both arms.          OBJECTIVE: See eval   Observation: improving R GHJ mobility with reduction of TrPs   Test measurements:       PT Practice Pattern: I  Co morbidities: 1-2  surgical   INITIAL VISIT  CURRENT VISIT  21   PAIN  3-5/10 2-5/10   FUNCTIONAL SCALE QuickDASH (33) 50% disability   (31) 45% disability    ROM R shoulder flx 80 deg 140 deg pain     abd 75 deg 120  Deg pain      ER 0 deg  45 deg in scapular plane     IR 50 deg 70 deg                    STRENGHT (MMT) R shoulder        flx NT  3/5     abd NT  3/5     ER NT  3/5     IR NT  4/5                         RESTRICTIONS/PRECAUTIONS:  s/p R mini open RC repair, arthroscopy, Neer acromioplasty and debridement DOS 2020; Use 3/3/3 Protocol; Beginning 12/15 pt can transition to [facility]; Progress to AROM on 21     Exercises/Interventions:   Matias Vargasissant: [id number] see scanned forms    Therapeutic Ex (67529) Sets/sec Reps Notes/CUES   scap retraction 5\"Hx10      X HEP   Flexion table slides  10\"x10  X HEP    X HEP    X HEP    X HEP       5\"x10  X HEP   10\"Hx10  Pain free          Cane shoulder flx  5\"Hx15      10x10\"     Supine cane ER @ 30/60 ABD 10x5\"  X    10x10\"  X 12   5x5\"  ea  X    Corner pec stretch 10\"x10  VC for gentle stretch   2x10  YTB   2x10  YTB   Finisher table: flx, V, arcs 10x ea     2x10  0#   2x10   0#   Standing IR/ER YTB 2x10 ea           Pt education on current condition, POC, expectations for therapy, and HEP as well as precautions and continuation of icing at home             Manual Intervention ([id number])      STM, TrPR to R RC and bicep and UT, GrI-III GHJ mobs/distraction/oscillations, PROM R shoulder all directions 10'  Greater ease tolerating PROM into Flx, abd, and ER                              NMR re-education (92158)   CUES NEEDED   Scapular wall slides w towel 3D 5\"Hx10 ea   Able to perform without pain today    2x10 ea     PNF D1/D2 flexion supine 2x10 ea  Assisted; with manual resistance    Scaption 10x   0#, standing; difficulty preventing shoulder shrug, fatigued                                 Therapeutic Activity (72407)                                                Therapeutic Exercise and NMR EXR  [x] (21557) Provided verbal/tactile cueing for activities related to 39'   Total Treatment Minutes: 54'          [] EVAL (LOW) 455 1011   [] EVAL (MOD) 97507   [] EVAL (HIGH) 07158   [] RE-EVAL     [x] VK(15431) x 1    [] IONTO  [x] NMR (96765) x 1    [] VASO  [x] Manual (73770) x 1     [] Other:  [] TA x      [] Mech Traction (15758)  [] ES(attended) (54530)      [] ES (un) (89201):     GOALS:  Patient stated goal: Patient will be able to style her hair without R shoulder pain. - she is now able to wash and brush her hair but not able to style it independently   [x] Progressing: [] Met: [] Not Met: [] Adjusted    Therapist goals for Patient:   Short Term Goals: To be achieved in: 2 weeks  1. Independent in HEP and progression per patient tolerance, in order to prevent re-injury. [x] Progressing: [x] Met: [] Not Met: [] Adjusted  2. Patient will have a decrease in pain to facilitate improvement in movement, function, and ADLs as indicated by Functional Deficits. [x] Progressing: [] Met: [] Not Met: [] Adjusted    Long Term Goals: To be achieved in: 6 weeks  1. Disability index score of 25% or less for the [facility] to assist with reaching prior level of function. [x] Progressing: [] Met: [] Not Met: [] Adjusted  2. Patient will demonstrate increased AROM to 160 deg FE of R shoulder to allow for proper joint functioning as indicated by patients Functional Deficits. [x] Progressing: [] Met: [] Not Met: [] Adjusted  3. Patient will demonstrate an increase in Strength to 4+/5 of R RC to allow for proper functional mobility as indicated by patients Functional Deficits. [x] Progressing: [] Met: [] Not Met: [] Adjusted  4. Patient will return to 95% of functional activities without increased symptoms or restriction. [x] Progressing: [] Met: [] Not Met: [] Adjusted  5.  Patient will be able to raise her R arm OH without pain (patient specific functional goal)    [x] Progressing: [] Met: [] Not Met: [] Adjusted       Progression Towards Functional goals:  [] Patient is progressing as expected towards functional goals listed. [x] Progression is slowed due to complexities listed. [] Progression has been slowed due to co-morbidities. [] Plan just implemented, too soon to assess goals progression  [] Other:     ASSESSMENT:  Patient had improved tolerance to AAROM and AROM this date. Functional strengthening in PNF patterns was utilized with good tolerance. She does have difficulty with standing scaption with compensations of the R UT when nearing 90 deg elevation. She has most of her ROM limitations in a capsular pattern- watch for development of adhesive capsulitis in upcoming appts. Overall Progression Towards Functional goals/ Treatment Progress Update:  [] Patient is progressing as expected towards functional goals listed. [x] Progression is slowed due to complexities/Impairments listed. [] Progression has been slowed due to co-morbidities. [] Plan just implemented, too soon to assess goals progression <30days   [] Goals require adjustment due to lack of progress  [] Patient is not progressing as expected and requires additional follow up with physician  [] Other    Prognosis for POC: [x] Good [] Fair  [] Poor      Patient requires continued skilled intervention: [x] Yes  [] No    Treatment/Activity Tolerance:  [x] Patient able to complete treatment  [x] Patient limited by fatigue  [x] Patient limited by pain    [] Patient limited by other medical complications  [] Other:             PLAN: Cont R shoulder AAROM>AROM and scapular/shoulder strengthening. Continue 2x/week for 4 more weeks. [x] Continue per plan of care [] Alter current plan (see comments above)  [] Plan of care initiated [] Hold pending MD visit [] Discharge      Electronically signed by:  Ankit Muñoz, PT, DPT, [facility] [id number]     Note: If patient does not return for scheduled/ recommended follow up visits, this note will serve as a discharge from care along with most recent update on progress.

## 2021-01-28 ENCOUNTER — HOSPITAL ENCOUNTER (OUTPATIENT)
Dept: PHYSICAL THERAPY | Age: 56
Setting detail: THERAPIES SERIES
Discharge: HOME | End: 2021-01-28
Payer: COMMERCIAL

## 2021-02-02 ENCOUNTER — HOSPITAL ENCOUNTER (OUTPATIENT)
Dept: PHYSICAL THERAPY | Age: 56
Setting detail: THERAPIES SERIES
Discharge: HOME | End: 2021-02-02
Payer: COMMERCIAL

## 2021-02-02 PROCEDURE — 97140 MANUAL THERAPY 1/> REGIONS: CPT

## 2021-02-02 PROCEDURE — 97110 THERAPEUTIC EXERCISES: CPT

## 2021-02-02 PROCEDURE — 97112 NEUROMUSCULAR REEDUCATION: CPT

## 2021-02-02 NOTE — FLOWSHEET NOTE
[facility],  [address]  Phone: [phone]  Fax [phone]    Physical Therapy Daily Treatment Note        Date:  2021    Patient Name:  Jeremy Tavarez    :  1965  MRN: [id number]  Restrictions/Precautions:    Medical/Treatment Diagnosis Information:  · Diagnosis: Z98.890 s/p arthroscopy of the shoulder  · Treatment Diagnosis: M25.511 Right shoulder pain  Insurance/Certification information:  PT Insurance Information: [address] [place] [address]  Physician Information:  Referring Practitioner: [facility]  Has the plan of care been signed (Y/N):        []  Yes  [x]  No     Date of Patient follow up with Physician: 2021      Is this a Progress Report:     [x]  Yes  []  No        If Yes:  Date Range for reporting period:  Beginning 20  Ending 21    Progress report will be due (10 Rx or 30 days whichever is less): 20 Next progress on 82      Recertification will be due (POC Duration  / 90 days whichever is less): 20     Visit # Insurance Allowable Auth Required   In-person 8 30 []  Yes []  No    Telehealth     []  Yes []  No    Total            Functional Scale: QuickDASH: 33; 50% disability  Date assessed:  20      Therapy Diagnosis/Practice Pattern: I      Number of Comorbidities:  []0     [x]1-2    []3+     Latex Allergy:  [x]NO      []YES  Preferred Language for Healthcare:   [x]English       []other:      Pain level:  4/10 Tylenol taken this morning at 6AM     SUBJECTIVE:  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 She did wake up more sore today.          OBJECTIVE: See eval   Observation: improving R GHJ mobility with reduction of TrPs   Test measurements:       PT Practice Pattern: I  Co morbidities: 1-2  surgical   INITIAL VISIT  CURRENT VISIT  21   PAIN  3-5/10 2-5/10   FUNCTIONAL SCALE QuickDASH (33) 50% disability   (31) 45% disability    ROM R shoulder flx 80 deg 140 deg pain     abd 75 deg 120  Deg pain      ER 0 deg  45 deg in scapular plane     IR 50 deg 70 deg                    STRENGHT (MMT) R shoulder        flx NT  3/5     abd NT  3/5     ER NT  3/5     IR NT  4/5                         RESTRICTIONS/PRECAUTIONS:  s/p R mini open RC repair, arthroscopy, Neer acromioplasty and debridement DOS 11/24/2020; Use 3/3/3 Protocol; Beginning 12/15 pt can transition to [facility]; Progress to AROM on 1/5/21     Exercises/Interventions:   Kira Humphries: [id number] see scanned forms    Therapeutic Ex (38815) Sets/sec Reps Notes/CUES   scap retraction 5\"Hx10      X HEP    10\"x10  X HEP    X HEP    X HEP    X HEP       5\"x10  X HEP   10\"Hx10  Pain free           5\"Hx15      10x10\"      10x5\"  X 12/28   10x10\"  X 12/28   5x5\"  ea  X 12/28   Corner pec stretch 10\"x10  VC for gentle stretch   2x10  YTB   Shoulder [id number]  YTB    10x ea     2x10  0#   2x10   0#   Standing IR/ER RTB 2x15 ea  Fatigued following         Pt education on current condition, POC, expectations for therapy, and HEP as well as precautions and continuation of icing at home             Manual Intervention (70006)       GrI-III GHJ mobs/distraction/oscillations, PROM R shoulder all directions 8'  Greater ease tolerating PROM into Flx, abd, and ER                              NMR re-education (20186)   CUES NEEDED   Scapular wall slides w towel 3D 5\"Hx10 ea   Able to perform without pain today    2x10 ea     PNF D1/D2 flexion supine 2x10 ea  Assisted; with manual resistance    Scaption 10x2   0#, standing; improved form                                 Therapeutic Activity (06471)                                                Therapeutic Exercise and NMR EXR  [x] (59638) Provided verbal/tactile cueing for activities related to strengthening, flexibility, endurance, ROM  for improvements in scapular, scapulothoracic and UE control with self care, reaching, carrying, lifting, house/yardwork, driving/computer work.    [] (25664) Provided verbal/tactile cueing for activities related to improving balance, coordination, kinesthetic sense, posture, motor skill, proprioception  to assist with  scapular, scapulothoracic and UE control with self care, reaching, carrying, lifting, house/yardwork, driving/computer work. Therapeutic Activities:    [] (24379 or 22221) Provided verbal/tactile cueing for activities related to improving balance, coordination, kinesthetic sense, posture, motor skill, proprioception and motor activation to allow for proper function of scapular, scapulothoracic and UE control with self care, carrying, lifting, driving/computer work.      Home Exercise Program:    [x] (63557) Reviewed/Progressed HEP activities related to strengthening, flexibility, endurance, ROM of scapular, scapulothoracic and UE control with self care, reaching, carrying, lifting, house/yardwork, driving/computer work  [] (37776) Reviewed/Progressed HEP activities related to improving balance, coordination, kinesthetic sense, posture, motor skill, proprioception of scapular, scapulothoracic and UE control with self care, reaching, carrying, lifting, house/yardwork, driving/computer work      Manual Treatments:  PROM / STM / Oscillations-Mobs:  G-I, II, III, IV (PA's, Inf., Post.)  [x] (50492) Provided manual therapy to mobilize soft tissue/joints of cervical/CT, scapular GHJ and UE for the purpose of modulating pain, promoting relaxation,  increasing ROM, reducing/eliminating soft tissue swelling/inflammation/restriction, improving soft tissue extensibility and allowing for proper ROM for normal function with self care, reaching, carrying, lifting, house/yardwork, driving/computer work    Modalities: CPx10'    Charges:  Timed Code Treatment Minutes: 43'   Total Treatment Minutes: 48'          [] EVAL (LOW) [id number]   [] EVAL (MOD) 74654   [] EVAL (HIGH) 65621   [] RE-EVAL     [x] OZ(13307) x 1    [] IONTO  [x] NMR (19882) x 1    [] VASO  [x] Manual (90812) x 1     [] Other:  [] TA x      [] Mech Traction (17946)  [] ES(attended) (89437)      [] ES (un) (83212):     GOALS:  Patient stated goal: Patient will be able to style her hair without R shoulder pain. - she is now able to wash and brush her hair but not able to style it independently   [x] Progressing: [] Met: [] Not Met: [] Adjusted    Therapist goals for Patient:   Short Term Goals: To be achieved in: 2 weeks  1. Independent in HEP and progression per patient tolerance, in order to prevent re-injury. [x] Progressing: [x] Met: [] Not Met: [] Adjusted  2. Patient will have a decrease in pain to facilitate improvement in movement, function, and ADLs as indicated by Functional Deficits. [x] Progressing: [] Met: [] Not Met: [] Adjusted    Long Term Goals: To be achieved in: 6 weeks  1. Disability index score of 25% or less for the [facility] to assist with reaching prior level of function. [x] Progressing: [] Met: [] Not Met: [] Adjusted  2. Patient will demonstrate increased AROM to 160 deg FE of R shoulder to allow for proper joint functioning as indicated by patients Functional Deficits. [x] Progressing: [] Met: [] Not Met: [] Adjusted  3. Patient will demonstrate an increase in Strength to 4+/5 of R RC to allow for proper functional mobility as indicated by patients Functional Deficits. [x] Progressing: [] Met: [] Not Met: [] Adjusted  4. Patient will return to 95% of functional activities without increased symptoms or restriction. [x] Progressing: [] Met: [] Not Met: [] Adjusted  5. Patient will be able to raise her R arm OH without pain (patient specific functional goal)    [x] Progressing: [] Met: [] Not Met: [] Adjusted       Progression Towards Functional goals:  [] Patient is progressing as expected towards functional goals listed.     [x] Progression is slowed due to complexities listed. [] Progression has been slowed due to co-morbidities. [] Plan just implemented, too soon to assess goals progression  [] Other:     ASSESSMENT:  Patient is doing well with improving strength and endurance of the R shoulder. Continues to have ROM limitations but has minimal to no pain at end ranges. She was provided updated HEP this date. Patient would benefit from cont skilled PT in order to progress towards full return to ADLs and functional mobility activities. Overall Progression Towards Functional goals/ Treatment Progress Update:  [] Patient is progressing as expected towards functional goals listed. [x] Progression is slowed due to complexities/Impairments listed. [] Progression has been slowed due to co-morbidities. [] Plan just implemented, too soon to assess goals progression <30days   [] Goals require adjustment due to lack of progress  [] Patient is not progressing as expected and requires additional follow up with physician  [] Other    Prognosis for POC: [x] Good [] Fair  [] Poor      Patient requires continued skilled intervention: [x] Yes  [] No    Treatment/Activity Tolerance:  [x] Patient able to complete treatment  [x] Patient limited by fatigue  [x] Patient limited by pain    [] Patient limited by other medical complications  [] Other:             PLAN: Cont R shoulder AAROM>AROM and scapular/shoulder strengthening. Continue 2x/week for 4 more weeks. [x] Continue per plan of care [] Alter current plan (see comments above)  [] Plan of care initiated [] Hold pending MD visit [] Discharge      Electronically signed by:  Lance Kumar, PT, DPT, [facility] [id number]     Note: If patient does not return for scheduled/ recommended follow up visits, this note will serve as a discharge from care along with most recent update on progress.

## 2021-02-04 ENCOUNTER — HOSPITAL ENCOUNTER (OUTPATIENT)
Dept: PHYSICAL THERAPY | Age: 56
Setting detail: THERAPIES SERIES
Discharge: HOME | End: 2021-02-04
Payer: COMMERCIAL

## 2021-02-04 PROCEDURE — 97110 THERAPEUTIC EXERCISES: CPT

## 2021-02-04 PROCEDURE — G0283 ELEC STIM OTHER THAN WOUND: HCPCS

## 2021-02-04 PROCEDURE — 97140 MANUAL THERAPY 1/> REGIONS: CPT

## 2021-02-04 PROCEDURE — 97112 NEUROMUSCULAR REEDUCATION: CPT

## 2021-02-04 NOTE — FLOWSHEET NOTE
[facility], [address] [place]  Phone: [phone]  Fax [phone]    Physical Therapy Daily Treatment Note        Date:  2021    Patient Name:  Mayur Apodaca    :  1965  MRN: [id number]  Restrictions/Precautions:    Medical/Treatment Diagnosis Information:  · Diagnosis: Z98.890 s/p arthroscopy of the shoulder  · Treatment Diagnosis: M25.511 Right shoulder pain  Insurance/Certification information:  PT Insurance Information: Kamari Rodriguez  Physician Information:  Referring Practitioner: FER Buck  Has the plan of care been signed (Y/N):        []  Yes  [x]  No     Date of Patient follow up with Physician: 2021      Is this a Progress Report:     [x]  Yes  []  No        If Yes:  Date Range for reporting period:  Beginning 20  Ending 21    Progress report will be due (10 Rx or 30 days whichever is less): 20 Next progress on 42      Recertification will be due (POC Duration  / 90 days whichever is less): 20     Visit # Insurance Allowable Auth Required   In-person 9 30 []  Yes []  No    Telehealth     []  Yes []  No    Total            Functional Scale: QuickDASH: 33; 50% disability  Date assessed:  20      Therapy Diagnosis/Practice Pattern: I      Number of Comorbidities:  []0     [x]1-2    []3+     Latex Allergy:  [x]NO      []YES  Preferred Language for Healthcare:   [x]English       []other:      Pain level:  2/10 Tylenol taken this morning at 5:30AM     SUBJECTIVE:  Patient reports a restless night of sleep due to difficulty getting comfortable because of her shoulder. She still has difficulty reaching up and back when putting her hair in a pony tail.         OBJECTIVE: See eval   Observation: improving R GHJ mobility with reduction of TrPs   Test measurements:       PT Practice Pattern: I  Co morbidities: 1-2  surgical   INITIAL VISIT  CURRENT VISIT  21 PAIN  3-5/10 2-5/10   FUNCTIONAL SCALE QuickDASH (33) 50% disability   (31) 45% disability    ROM R shoulder flx 80 deg 140 deg pain     abd 75 deg 120  Deg pain      ER 0 deg  45 deg in scapular plane     IR 50 deg 70 deg                    STRENGHT (MMT) R shoulder        flx NT  3/5     abd NT  3/5     ER NT  3/5     IR NT  4/5                         RESTRICTIONS/PRECAUTIONS:  s/p R mini open RC repair, arthroscopy, Neer acromioplasty and debridement DOS 11/24/2020; Use 3/3/3 Protocol; Beginning 12/15 pt can transition to [facility]; Progress to AROM on 1/5/21     Exercises/Interventions:   [address] [place] [address] Street: [id number] see scanned forms    Therapeutic Ex (65434) Sets/sec Reps Notes/CUES   scap retraction 5\"Hx10      X HEP    10\"x10  X HEP    X HEP    X HEP    X HEP       5\"x10  X HEP   10\"Hx10  Pain free           5\"Hx15      10x10\"      10x5\"  X 12/28   10x10\"  X 12/28   5x5\"  ea  X 12/28   Corner pec stretch 10\"x10  VC for gentle stretch   2x10  YTB   2x10  YTB    10x ea     SL ER 2x10  0#   SL ABD 2x10   0#   Standing IR/ER RTB 2x15 ea  Fatigued following         Pt education on current condition, POC, expectations for therapy, and HEP as well as precautions and continuation of icing at home             Manual Intervention ([phone])       GrI-III GHJ mobs/distraction/oscillations, PROM R shoulder all directions, post capsule stretch,  8'  Greater ease tolerating PROM into Flx, abd, and ER                              NMR re-education (52793)   CUES NEEDED   Scapular wall slides w towel 3D 5\"Hx10 ea   Able to perform without pain today    2x10 ea     PNF D1/D2 flexion supine 2x10 ea  Independently performed today with soreness following in ant shoulder   Scaption 10x2   0#, standing; improved form                                 Therapeutic Activity (76513)                                                Therapeutic Exercise and NMR EXR  [x] (50697) Provided verbal/tactile cueing for activities related to strengthening, flexibility, endurance, ROM  for improvements in scapular, scapulothoracic and UE control with self care, reaching, carrying, lifting, house/yardwork, driving/computer work.    [] (34964) Provided verbal/tactile cueing for activities related to improving balance, coordination, kinesthetic sense, posture, motor skill, proprioception  to assist with  scapular, scapulothoracic and UE control with self care, reaching, carrying, lifting, house/yardwork, driving/computer work. Therapeutic Activities:    [] (39164 or 18505) Provided verbal/tactile cueing for activities related to improving balance, coordination, kinesthetic sense, posture, motor skill, proprioception and motor activation to allow for proper function of scapular, scapulothoracic and UE control with self care, carrying, lifting, driving/computer work.      Home Exercise Program:    [x] (41903) Reviewed/Progressed HEP activities related to strengthening, flexibility, endurance, ROM of scapular, scapulothoracic and UE control with self care, reaching, carrying, lifting, house/yardwork, driving/computer work  [] (21025) Reviewed/Progressed HEP activities related to improving balance, coordination, kinesthetic sense, posture, motor skill, proprioception of scapular, scapulothoracic and UE control with self care, reaching, carrying, lifting, house/yardwork, driving/computer work      Manual Treatments:  PROM / STM / Oscillations-Mobs:  G-I, II, III, IV (PA's, Inf., Post.)  [x] (87527) Provided manual therapy to mobilize soft tissue/joints of cervical/CT, scapular GHJ and UE for the purpose of modulating pain, promoting relaxation,  increasing ROM, reducing/eliminating soft tissue swelling/inflammation/restriction, improving soft tissue extensibility and allowing for proper ROM for normal function with self care, reaching, carrying, lifting, house/yardwork, driving/computer work    Modalities: IFC + CPx10'    Charges:  Timed Code Treatment Minutes: 45'   Total Treatment Minutes: 54'          [] EVAL (LOW) 33638   [] EVAL (MOD) 92319   [] EVAL (HIGH) 29679   [] RE-EVAL     [x] IA(89274) x 1    [] IONTO  [x] NMR (32712) x 1    [] VASO  [x] Manual (94159) x 1     [] Other:  [] TA x      [] Mech Traction (58444)  [] ES(attended) (44786)      [x] ES (un) (34623):     GOALS:  Patient stated goal: Patient will be able to style her hair without R shoulder pain. - she is now able to wash and brush her hair but not able to style it independently   [x] Progressing: [] Met: [] Not Met: [] Adjusted    Therapist goals for Patient:   Short Term Goals: To be achieved in: 2 weeks  1. Independent in HEP and progression per patient tolerance, in order to prevent re-injury. [x] Progressing: [x] Met: [] Not Met: [] Adjusted  2. Patient will have a decrease in pain to facilitate improvement in movement, function, and ADLs as indicated by Functional Deficits. [x] Progressing: [] Met: [] Not Met: [] Adjusted    Long Term Goals: To be achieved in: 6 weeks  1. Disability index score of 25% or less for the [facility] to assist with reaching prior level of function. [x] Progressing: [] Met: [] Not Met: [] Adjusted  2. Patient will demonstrate increased AROM to 160 deg FE of R shoulder to allow for proper joint functioning as indicated by patients Functional Deficits. [x] Progressing: [] Met: [] Not Met: [] Adjusted  3. Patient will demonstrate an increase in Strength to 4+/5 of R RC to allow for proper functional mobility as indicated by patients Functional Deficits. [x] Progressing: [] Met: [] Not Met: [] Adjusted  4. Patient will return to 95% of functional activities without increased symptoms or restriction. [x] Progressing: [] Met: [] Not Met: [] Adjusted  5.  Patient will be able to raise her R arm [place] without pain (patient specific functional goal)    [x] Progressing: [] Met: [] Not Met: [] Adjusted       Progression Towards Functional goals:  [] Patient is progressing as expected towards functional goals listed. [x] Progression is slowed due to complexities listed. [] Progression has been slowed due to co-morbidities. [] Plan just implemented, too soon to assess goals progression  [] Other:     ASSESSMENT:  Patient presented with more soreness in her R shoulder anteriorly on arrival this morning. Tightness into ER limited ROM into PNF patterns- patient educated to ensure she is consistent in performing her HEP to fully regain this ROM in order to assist in doing [place] and self -care activities like pulling back her hair. Patient would benefit from cont skilled PT in order to progress towards full return to ADLs and functional mobility activities. Overall Progression Towards Functional goals/ Treatment Progress Update:  [] Patient is progressing as expected towards functional goals listed. [x] Progression is slowed due to complexities/Impairments listed. [] Progression has been slowed due to co-morbidities. [] Plan just implemented, too soon to assess goals progression <30days   [] Goals require adjustment due to lack of progress  [] Patient is not progressing as expected and requires additional follow up with physician  [] Other    Prognosis for POC: [x] Good [] Fair  [] Poor      Patient requires continued skilled intervention: [x] Yes  [] No    Treatment/Activity Tolerance:  [x] Patient able to complete treatment  [x] Patient limited by fatigue  [x] Patient limited by pain    [] Patient limited by other medical complications  [] Other:             PLAN: Cont R shoulder AAROM>AROM and scapular/shoulder strengthening. Continue 2x/week for 4 more weeks.     [x] Continue per plan of care [] Alter current plan (see comments above)  [] Plan of care initiated [] Hold pending MD visit [] Discharge      Electronically signed by:  Aspen Hall, PT, DPT, [facility] [id number]     Note: If patient does not return for scheduled/ recommended follow up visits, this note will serve as a discharge from care along with most recent update on progress.

## 2021-02-09 ENCOUNTER — HOSPITAL ENCOUNTER (OUTPATIENT)
Dept: PHYSICAL THERAPY | Age: 56
Setting detail: THERAPIES SERIES
Discharge: HOME | End: 2021-02-09
Payer: COMMERCIAL

## 2021-02-09 NOTE — FLOWSHEET NOTE
[facility],  [address]        Physical Therapy  Cancellation/No-show Note  Patient Name:  Debora Herman  :  1965   Date:  2021  Cancelled visits to date: 2  No-shows to date: 1    For today's appointment patient:  []  Cancelled  []  Rescheduled appointment  [x]  No-show     Reason given by patient:  []  Patient ill  []  Conflicting appointment  []  No transportation    []  Conflict with work  [x]  No reason given  []  Other:     Comments:     Electronically signed by:  Aspen Hall, PT, DPT, [address]

## 2021-02-11 ENCOUNTER — HOSPITAL ENCOUNTER (OUTPATIENT)
Dept: PHYSICAL THERAPY | Age: 56
Setting detail: THERAPIES SERIES
Discharge: HOME | End: 2021-02-11
Payer: COMMERCIAL

## 2021-02-16 ENCOUNTER — APPOINTMENT (OUTPATIENT)
Dept: PHYSICAL THERAPY | Age: 56
End: 2021-02-16
Payer: COMMERCIAL

## 2021-02-18 ENCOUNTER — HOSPITAL ENCOUNTER (OUTPATIENT)
Dept: PHYSICAL THERAPY | Age: 56
Setting detail: THERAPIES SERIES
Discharge: HOME | End: 2021-02-18
Payer: COMMERCIAL

## 2021-02-24 ENCOUNTER — HOSPITAL ENCOUNTER (OUTPATIENT)
Dept: PHYSICAL THERAPY | Age: 56
Setting detail: THERAPIES SERIES
Discharge: HOME | End: 2021-02-24
Payer: COMMERCIAL

## 2021-02-24 PROCEDURE — G0283 ELEC STIM OTHER THAN WOUND: HCPCS | Performed by: PHYSICAL THERAPIST

## 2021-02-24 PROCEDURE — 97110 THERAPEUTIC EXERCISES: CPT | Performed by: PHYSICAL THERAPIST

## 2021-02-24 PROCEDURE — 97140 MANUAL THERAPY 1/> REGIONS: CPT | Performed by: PHYSICAL THERAPIST

## 2021-02-24 NOTE — PROGRESS NOTES
VISIT  2/24/21   PAIN  3-5/10 5/10   FUNCTIONAL SCALE QuickDASH (33) 50% disability  (25) 69%    AROM R shoulder flx 80 deg 135 deg pain     abd 75 deg      ER 0 deg 30 deg in scapular plane     IR 50 deg 70 deg                    STRENGHT (MMT) R shoulder        flx NT  3/5     abd NT  3/5     ER NT  3/5     IR NT  4/5                           GOALS:  Patient stated goal: Patient will be able to style her hair without R shoulder pain. - she is now able to wash and brush her hair but not able to style it independently   [x] Progressing: [] Met: [] Not Met: [] Adjusted    Therapist goals for Patient:   Short Term Goals: To be achieved in: 2 weeks  1. Independent in HEP and progression per patient tolerance, in order to prevent re-injury. [x] Progressing: [x] Met: [] Not Met: [] Adjusted  2. Patient will have a decrease in pain to facilitate improvement in movement, function, and ADLs as indicated by Functional Deficits. [x] Progressing: [] Met: [] Not Met: [] Adjusted    Long Term Goals: To be achieved in: 6 weeks  1. Disability index score of 25% or less for the [facility] to assist with reaching prior level of function. [x] Progressing: [] Met: [] Not Met: [] Adjusted  2. Patient will demonstrate increased AROM to 160 deg FE of R shoulder to allow for proper joint functioning as indicated by patients Functional Deficits. [x] Progressing: [] Met: [] Not Met: [] Adjusted  3. Patient will demonstrate an increase in Strength to 4+/5 of R RC to allow for proper functional mobility as indicated by patients Functional Deficits. [x] Progressing: [] Met: [] Not Met: [] Adjusted  4. Patient will return to 95% of functional activities without increased symptoms or restriction. [x] Progressing: [] Met: [] Not Met: [] Adjusted  5.  Patient will be able to raise her R arm [place] without pain (patient specific functional goal)    [x] Progressing: [] Met: [] Not Met: [] Adjusted       Progression Towards Functional

## 2021-02-24 NOTE — FLOWSHEET NOTE
[facility],  [address]  Phone: [phone]  Fax [phone]    Physical Therapy Daily Treatment Note        Date:  2021    Patient Name:  Tiffany Peters    :  1965  MRN: [id number]  Restrictions/Precautions:    Medical/Treatment Diagnosis Information:  · Diagnosis: Z98.890 s/p arthroscopy of the shoulder  · Treatment Diagnosis: M25.511 Right shoulder pain  Insurance/Certification information:  PT Insurance Information: [facility] [address] [place] [address]  Physician Information:  Referring Practitioner: FER Ibarra  Has the plan of care been signed (Y/N):        []  Yes  [x]  No     Date of Patient follow up with Physician: 2021      Is this a Progress Report:     [x]  Yes  []  No        If Yes:  Date Range for reporting period:  Beginning 21  Ending 21    Progress report will be due (10 Rx or 30 days whichever is less): 20 Next progress on 21; done       Recertification will be due (POC Duration  / 90 days whichever is less): 20 done 21     Visit # Insurance Allowable Auth Required   In-person 10 30 []  Yes []  No    Telehealth     []  Yes []  No    Total            Functional Scale: QuickDASH: 33; 50% disability  Date assessed:  20      Therapy Diagnosis/Practice Pattern: I      Number of Comorbidities:  []0     [x]1-2    []3+     Latex Allergy:  [x]NO      []YES  Preferred Language for Healthcare:   [x]English       []other:      Pain level:  5/10 Tylenol taken this morning at 5:30AM     SUBJECTIVE:  Pt reports she is doing her exercises only every few days. She  Reports lifting things at home she is not strong enough to lift. Pt complains of shoulder pain and RUT pain.       OBJECTIVE: Pt has not been here in 2 weeks for PT.   Observation:   Test measurements:  See below     PT Practice Pattern: I  Co morbidities: 1-2  surgical   INITIAL VISIT  CURRENT VISIT  2/24/21   PAIN  3-5/10 5/10   FUNCTIONAL SCALE QuickDASH (33) 50% disability  (25) 69%    AROM R shoulder flx 80 deg 135 deg pain     abd 75 deg      ER 0 deg 30 deg in scapular plane     IR 50 deg 70 deg                    STRENGHT (MMT) R shoulder        flx NT  3/5     abd NT  3/5     ER NT  3/5     IR NT  4/5                         RESTRICTIONS/PRECAUTIONS:  s/p R mini open RC repair, arthroscopy, Neer acromioplasty and debridement DOS 11/24/2020; Use 3/3/3 Protocol; Beginning 12/15 pt can transition to [facility]; Progress to AROM on 1/5/21     Exercises/Interventions:   Ian Rosales: [id number] see scanned forms    Therapeutic Ex (78488) Sets/sec Reps Notes/CUES   scap retraction 5\"Hx10      X HEP    10\"x10  X HEP    X HEP    X HEP    X HEP       5\"x10  X HEP   Pulleys scaption 10\"Hx10  Pain free           5\"Hx15      10x10\"      10x5\"  X 12/28   10x10\"  X 12/28   5x5\"  ea  X 12/28   Corner pec stretch 10\"x10  VC for gentle stretch   2x10  YTB   2x10  YTB          0#    0#    Fatigued following         Finisher ladders, RUE cross corner, arc swipe with LUE stationary  R/L stagger stance  10x ea                 Reassessment for PN and to determine POC and progression of treatment and therex x8'     Pt education  x15'  Importance of doing HEP in order to progress, lack of progression and tightness/pain; education and placement of TENs         Manual Intervention ([id number])       GrI-III GHJ mobs/distraction/oscillations, PROM R shoulder all directions, post capsule stretch, UT TPR 8'  Did not tolerated PROM well, had to stop                             NMR re-education (42677)   CUES NEEDED   Able to perform without pain today      Independently performed today with soreness following in ant shoulder   0#, standing; improved form                                 Therapeutic Activity (59847)                                                Therapeutic Exercise and NMR EXR  [x] (71100) Provided verbal/tactile cueing for activities related to strengthening, flexibility, endurance, ROM  for improvements in scapular, scapulothoracic and UE control with self care, reaching, carrying, lifting, house/yardwork, driving/computer work.    [] (35931) Provided verbal/tactile cueing for activities related to improving balance, coordination, kinesthetic sense, posture, motor skill, proprioception  to assist with  scapular, scapulothoracic and UE control with self care, reaching, carrying, lifting, house/yardwork, driving/computer work. Therapeutic Activities:    [] (87503 or 57098) Provided verbal/tactile cueing for activities related to improving balance, coordination, kinesthetic sense, posture, motor skill, proprioception and motor activation to allow for proper function of scapular, scapulothoracic and UE control with self care, carrying, lifting, driving/computer work.      Home Exercise Program:    [x] (67056) Reviewed/Progressed HEP activities related to strengthening, flexibility, endurance, ROM of scapular, scapulothoracic and UE control with self care, reaching, carrying, lifting, house/yardwork, driving/computer work  [] (77751) Reviewed/Progressed HEP activities related to improving balance, coordination, kinesthetic sense, posture, motor skill, proprioception of scapular, scapulothoracic and UE control with self care, reaching, carrying, lifting, house/yardwork, driving/computer work      Manual Treatments:  PROM / STM / Oscillations-Mobs:  G-I, II, III, IV (PA's, Inf., Post.)  [x] (85781) Provided manual therapy to mobilize soft tissue/joints of cervical/CT, scapular GHJ and UE for the purpose of modulating pain, promoting relaxation,  increasing ROM, reducing/eliminating soft tissue swelling/inflammation/restriction, improving soft tissue extensibility and allowing for proper ROM for normal function with self care, reaching, carrying, lifting, house/yardwork, driving/computer work    Modalities: IFC x10'    Charges:  Timed Code Treatment Minutes: Total Treatment Minutes:           [] EVAL (LOW) 56530   [] EVAL (MOD) 66000   [] EVAL (HIGH) 37499   [] RE-EVAL     [x] QF(13474) x 2    [] IONTO  [] NMR (20733) x     [] VASO  [x] Manual (65056) x 1     [] Other:  [] TA x      [] Mech Traction (21811)  [] ES(attended) (51961)      [x] ES (un) (40691):     GOALS:  Patient stated goal: Patient will be able to style her hair without R shoulder pain. - she is now able to wash and brush her hair but not able to style it independently   [x] Progressing: [] Met: [] Not Met: [] Adjusted    Therapist goals for Patient:   Short Term Goals: To be achieved in: 2 weeks  1. Independent in HEP and progression per patient tolerance, in order to prevent re-injury. [x] Progressing: [x] Met: [] Not Met: [] Adjusted  2. Patient will have a decrease in pain to facilitate improvement in movement, function, and ADLs as indicated by Functional Deficits. [x] Progressing: [] Met: [] Not Met: [] Adjusted    Long Term Goals: To be achieved in: 6 weeks  1. Disability index score of 25% or less for the [facility] to assist with reaching prior level of function. [x] Progressing: [] Met: [] Not Met: [] Adjusted  2. Patient will demonstrate increased AROM to 160 deg FE of R shoulder to allow for proper joint functioning as indicated by patients Functional Deficits. [x] Progressing: [] Met: [] Not Met: [] Adjusted  3. Patient will demonstrate an increase in Strength to 4+/5 of R RC to allow for proper functional mobility as indicated by patients Functional Deficits. [x] Progressing: [] Met: [] Not Met: [] Adjusted  4. Patient will return to 95% of functional activities without increased symptoms or restriction. [x] Progressing: [] Met: [] Not Met: [] Adjusted  5.  Patient will be able to raise her R arm [place] without pain (patient specific functional goal)    [x] Progressing: [] Met: [] Not Met: [] Adjusted       Progression Towards Functional goals:  [] Patient is progressing as expected towards functional goals listed. [x] Progression is slowed due to complexities listed. See assessment. [] Progression has been slowed due to co-morbidities. [] Plan just implemented, too soon to assess goals progression  [] Other:     ASSESSMENT:  Pt does not appear to be compliant with restrictions and/or HEP instructions and PT visits. Her mobility is significantly decreased at the onset of this visit. She was instructed on use of her TENS unit as well today. Patient would benefit from cont skilled PT in order to progress towards full return to ADLs and functional mobility activities. Overall Progression Towards Functional goals/ Treatment Progress Update:  [] Patient is progressing as expected towards functional goals listed. [x] Progression is slowed due to complexities/Impairments listed. [] Progression has been slowed due to co-morbidities. [] Plan just implemented, too soon to assess goals progression <30days   [] Goals require adjustment due to lack of progress  [] Patient is not progressing as expected and requires additional follow up with physician  [] Other    Prognosis for POC: [x] Good [] Fair  [] Poor      Patient requires continued skilled intervention: [x] Yes  [] No    Treatment/Activity Tolerance:  [x] Patient able to complete treatment  [x] Patient limited by fatigue  [x] Patient limited by pain    [] Patient limited by other medical complications  [] Other:             PLAN: Cont R shoulder AAROM>AROM and scapular/shoulder strengthening. Continue 2x/week for 3 more weeks. [x] Continue per plan of care [] Alter current plan (see comments above)  [] Plan of care initiated [] Hold pending MD visit [] Discharge      Electronically signed by:  Nannette Solano, PT, [id number]  Note: If patient does not return for scheduled/ recommended follow up visits, this note will serve as a discharge from care along with most recent update on progress.

## 2021-02-24 NOTE — PLAN OF CARE
[facility], [facility]  [address] [place] [address], [address] [place] [address]  Phone: [phone]  Fax [phone]     Physical Therapy Re-Certification Plan of Care    Dear  Dr Zak Bertrand,    We had the pleasure of treating the following patient for physical therapy services at [address] [place] [address]. A summary of our findings can be found in the updated assessment below. This includes our plan of care. If you have any questions or concerns regarding these findings, please do not hesitate to contact me at the office phone number checked above. Thank you for the referral.     Physician Signature:________________________________Date:__________________  By signing above, therapists plan is approved by physician    Date:  2021     Patient Name:  Quinn Sharma                :  1965                     MRN: [id number]  Restrictions/Precautions:    Medical/Treatment Diagnosis Information:  · Diagnosis: Z98.890 s/p arthroscopy of the shoulder  · Treatment Diagnosis: M25.511 Right shoulder pain  Insurance/Certification information:  PT Insurance Information: [address] [place] [address]  Physician Information:  Referring Practitioner: FER Hong  Has the plan of care been signed (Y/N):        []? Yes                    [x]? No       Date of Patient follow up with Physician: 2021        Is this a Progress Report:        [x]? Yes                    []?  No          If Yes:  Date Range for reporting period:  Beginning 21  Ending 21     Progress report will be due (10 Rx or 30 days whichever is less): 20 Next progress on 21; done         Recertification will be due (POC Duration  / 90 days whichever is less): 20 done 21       Visit # Insurance Allowable Auth Required   In-person 10 30 []? Yes     []? No          Telehealth     []? Yes     []?   No          Total                  Functional Scale: QuickDASH: 33; 50% disability                        Date assessed:  12/18/20                          Therapy Diagnosis/Practice Pattern: I                     Number of Comorbidities:  []?0     [x]? 1-2    []?3+      Latex Allergy:  [x]? NO      []? YES  Preferred Language for Healthcare:   [x]? English       []? other:        Pain level:  5/10 Tylenol taken this morning at 5:30AM             SUBJECTIVE:  Pt reports she is doing her exercises only every few days. She  Reports lifting things at home she is not strong enough to lift. Pt complains of shoulder pain and RUT pain.       OBJECTIVE: Pt has not been here in 2 weeks for PT.  · Observation:   Test measurements:  See below     PT Practice Pattern: I  Co morbidities: 1-2  surgical   INITIAL VISIT  CURRENT VISIT  2/24/21   PAIN   3-5/10 5/10   FUNCTIONAL SCALE QuickDASH (33) 50% disability  (25) 69%    AROM R shoulder flx 80 deg 135 deg pain     abd 75 deg       ER 0 deg 30 deg in scapular plane     IR 50 deg 70 deg                        STRENGHT (MMT) R shoulder         flx NT  3/5     abd NT  3/5     ER NT  3/5     IR NT  4/5                                   GOALS:  Patient stated goal: Patient will be able to style her hair without R shoulder pain. - she is now able to wash and brush her hair but not able to style it independently   [x]? Progressing: []? Met: []? Not Met: []? Adjusted     Therapist goals for Patient:   Short Term Goals: To be achieved in: 2 weeks  1. Independent in HEP and progression per patient tolerance, in order to prevent re-injury. [x]? Progressing: [x]? Met: []? Not Met: []? Adjusted  2. Patient will have a decrease in pain to facilitate improvement in movement, function, and ADLs as indicated by Functional Deficits. [x]? Progressing: []? Met: []? Not Met: []? Adjusted     Long Term Goals: To be achieved in: 6 weeks  1. Disability index score of 25% or less for the [facility] to assist with reaching prior level of function. [x]?  Progressing: []? physician  []? Other     Prognosis for POC:     [x]? Good          []? Fair             []? Poor        Patient requires continued skilled intervention:         [x]? Yes             []? No     Treatment/Activity Tolerance:  [x]? Patient able to complete treatment  [x]? Patient limited by fatigue  [x]? Patient limited by pain                     []? Patient limited by other medical complications  []? Other:                PLAN: Cont R shoulder AAROM>AROM and scapular/shoulder strengthening. Continue 2x/week for 3 more weeks. [x]? Continue per plan of care  []? Alter current plan (see comments above)  []? Plan of care initiated          []? Hold pending MD visit       []? Discharge          New or Updated Goals (if applicable):  [] No change to goals established upon initial eval/last progress note: New goal time frame 4 weeks from today 2/24/21      Plan of Care:  [x] Continue Current Therapy Intervention    Frequency/Duration:  2 days per week for 4 Weeks:  HEP instruction: continuing   1. Therapeutic exercise including: strength training, ROM, NMR and proprioception for the scapula, core and Upper extremity  2. Manual therapy as indicated including Dry Needling/IASTM, STM, PROM, Gr I-IV mobilizations, spinal mobilization/manipulation. 3. Modalities as needed including: thermal agents, E-stim, US, iontophoresis as indicated. 4. Patient education on joint protection, activity modification, progression of HEP.        Electronically signed by:  Cammie Hatfield, [address] [facility]

## 2021-02-26 ENCOUNTER — HOSPITAL ENCOUNTER (OUTPATIENT)
Dept: PHYSICAL THERAPY | Age: 56
Setting detail: THERAPIES SERIES
Discharge: HOME | End: 2021-02-26
Payer: COMMERCIAL

## 2021-02-26 NOTE — FLOWSHEET NOTE
[facility],  [address]        Physical Therapy  Cancellation/No-show Note  Patient Name:  Yoly Preston  :  1965   Date:  2021  Cancelled visits to date: 5  No-shows to date: 1    For today's appointment patient:  [x]  Cancelled  []  Rescheduled appointment  []  No-show     Reason given by patient:  []  Patient ill  []  Conflicting appointment  []  No transportation    []  Conflict with work  []  No reason given  [x]  Other:  Patient reports she threw her back out   Comments:     Electronically signed by:  Deacon Dolan, PT, DPT, [address] [facility]

## 2021-03-01 ENCOUNTER — HOSPITAL ENCOUNTER (OUTPATIENT)
Dept: PHYSICAL THERAPY | Age: 56
Setting detail: THERAPIES SERIES
Discharge: HOME | End: 2021-03-01
Payer: COMMERCIAL

## 2021-03-02 ENCOUNTER — HOSPITAL ENCOUNTER (OUTPATIENT)
Dept: PHYSICAL THERAPY | Age: 56
Setting detail: THERAPIES SERIES
Discharge: HOME | End: 2021-03-02
Payer: COMMERCIAL

## 2021-03-02 PROCEDURE — 97110 THERAPEUTIC EXERCISES: CPT

## 2021-03-02 PROCEDURE — G0283 ELEC STIM OTHER THAN WOUND: HCPCS

## 2021-03-02 PROCEDURE — 97140 MANUAL THERAPY 1/> REGIONS: CPT

## 2021-03-02 PROCEDURE — 97112 NEUROMUSCULAR REEDUCATION: CPT

## 2021-03-02 NOTE — FLOWSHEET NOTE
[facility],  [address]  Phone: [phone]  Fax [phone]    Physical Therapy Daily Treatment Note        Date:  3/2/2021    Patient Name:  Quinn Sharma    :  1965  MRN: [id number]  Restrictions/Precautions:    Medical/Treatment Diagnosis Information:  · Diagnosis: Z98.890 s/p arthroscopy of the shoulder  · Treatment Diagnosis: M25.511 Right shoulder pain  Insurance/Certification information:  PT Insurance Information: [place]  Physician Information:  Referring Practitioner: FER Hong  Has the plan of care been signed (Y/N):        []  Yes  [x]  No     Date of Patient follow up with Physician: 2021      Is this a Progress Report:     [x]  Yes  []  No        If Yes:  Date Range for reporting period:  Beginning 21  Ending 21    Progress report will be due (10 Rx or 30 days whichever is less): 20 Next progress on 21; done       Recertification will be due (POC Duration  / 90 days whichever is less): 20 done 21     Visit # Insurance Allowable Auth Required   In-person 11 30 []  Yes []  No    Telehealth     []  Yes []  No    Total            Functional Scale: QuickDASH: 33; 50% disability  Date assessed:  20      Therapy Diagnosis/Practice Pattern: I      Number of Comorbidities:  []0     [x]1-2    []3+     Latex Allergy:  [x]NO      []YES  Preferred Language for Healthcare:   [x]English       []other:      Pain level:  5/10 Tylenol taken this morning at 5:30AM     SUBJECTIVE:  Pt reports she continues to have pain in her back after \"throwing it out\" last week. 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       OBJECTIVE: Pt has not been here in 2 weeks for PT.   Observation: compensatory movements of the R trunk and neck during AROM of R GHJ with SB and UT over utilization  Test measurements:  See below     PT Practice Pattern: I  Co morbidities: 1-2  surgical   INITIAL VISIT  CURRENT VISIT  2/24/21   PAIN  3-5/10 5/10   FUNCTIONAL SCALE QuickDASH (33) 50% disability  (25) 69%    AROM R shoulder flx 80 deg 140 deg pain     abd 75 deg 140 deg      ER 0 deg 30 deg in scapular plane     IR 50 deg 70 deg                    STRENGHT (MMT) R shoulder        flx NT  3/5     abd NT  3/5     ER NT  3/5     IR NT  4/5                         RESTRICTIONS/PRECAUTIONS:  s/p R mini open RC repair, arthroscopy, Neer acromioplasty and debridement DOS 11/24/2020; Use 3/3/3 Protocol; Beginning 12/15 pt can transition to [facility]; Progress to AROM on 1/5/21     Exercises/Interventions:   [address] [place] [address] Street: [id number] see scanned forms    Therapeutic Ex (20424) Sets/sec Reps Notes/CUES   scap retraction 5\"Hx10      X HEP    10\"x10  X HEP    X HEP    X HEP    X HEP       5\"x10  X HEP   Pulleys scaption 10\"Hx10  Pain free           5\"Hx15      10x10\"      10x5\"  X 12/28   10x10\"  X 12/28   5x5\"  ea  X 12/28   Corner pec stretch 10\"x10  VC for gentle stretch   2x10  YTB   2x10  YTB         SL ER 2x10  1# sig fatigue; limited ROM   SL ABD 2x10   1#    2x15 ea  Fatigued following         Finisher ladders, RUE cross corner, arc swipe with LUE stationary  R/L stagger stance  10x ea  3#               Reassessment for PN and to determine POC and progression of treatment and therex x8'     Pt education  x15'  Importance of doing HEP in order to progress, lack of progression and tightness/pain; education and placement of TENs         Manual Intervention (92239)       GrI-III GHJ mobs/distraction/oscillations, PROM R shoulder all directions, post capsule stretch, UT TPR 8'                               NMR re-education (01385)   CUES NEEDED   Able to perform without pain today      PNF D1/D2 flexion supine YTB 2x10 ea Improved tolerance and ability to progress with resistance on 2nd set   Scaption 10x2 0#, standing; improved form                                 Therapeutic Activity (57050)                                                Therapeutic Exercise and NMR EXR  [x] (43793) Provided verbal/tactile cueing for activities related to strengthening, flexibility, endurance, ROM  for improvements in scapular, scapulothoracic and UE control with self care, reaching, carrying, lifting, house/yardwork, driving/computer work.    [] (82675) Provided verbal/tactile cueing for activities related to improving balance, coordination, kinesthetic sense, posture, motor skill, proprioception  to assist with  scapular, scapulothoracic and UE control with self care, reaching, carrying, lifting, house/yardwork, driving/computer work. Therapeutic Activities:    [] (03702 or 39589) Provided verbal/tactile cueing for activities related to improving balance, coordination, kinesthetic sense, posture, motor skill, proprioception and motor activation to allow for proper function of scapular, scapulothoracic and UE control with self care, carrying, lifting, driving/computer work.      Home Exercise Program:    [x] (72585) Reviewed/Progressed HEP activities related to strengthening, flexibility, endurance, ROM of scapular, scapulothoracic and UE control with self care, reaching, carrying, lifting, house/yardwork, driving/computer work  [] (99988) Reviewed/Progressed HEP activities related to improving balance, coordination, kinesthetic sense, posture, motor skill, proprioception of scapular, scapulothoracic and UE control with self care, reaching, carrying, lifting, house/yardwork, driving/computer work      Manual Treatments:  PROM / STM / Oscillations-Mobs:  G-I, II, III, IV (PA's, Inf., Post.)  [x] (18920) Provided manual therapy to mobilize soft tissue/joints of cervical/CT, scapular GHJ and UE for the purpose of modulating pain, promoting relaxation,  increasing ROM, reducing/eliminating soft tissue swelling/inflammation/restriction, improving soft tissue extensibility and allowing for proper ROM for normal function with self care, reaching, carrying, lifting, house/yardwork, driving/computer work    Modalities: IFC + CP x10'    Charges:  Timed Code Treatment Minutes: 39'   Total Treatment Minutes: 54'          [] EVAL (LOW) 02132   [] EVAL (MOD) 35634   [] EVAL (HIGH) 84352   [] RE-EVAL     [x] GA(86154) x 1    [] IONTO  [x] NMR (80180) x  1   [] VASO  [x] Manual (25887) x 1     [] Other:  [] TA x      [] Mech Traction (88582)  [] ES(attended) (01139)      [x] ES (un) (95746):     GOALS:  Patient stated goal: Patient will be able to style her hair without R shoulder pain. - she is now able to wash and brush her hair but not able to style it independently   [x] Progressing: [] Met: [] Not Met: [] Adjusted    Therapist goals for Patient:   Short Term Goals: To be achieved in: 2 weeks  1. Independent in HEP and progression per patient tolerance, in order to prevent re-injury. [x] Progressing: [x] Met: [] Not Met: [] Adjusted  2. Patient will have a decrease in pain to facilitate improvement in movement, function, and ADLs as indicated by Functional Deficits. [x] Progressing: [] Met: [] Not Met: [] Adjusted    Long Term Goals: To be achieved in: 6 weeks  1. Disability index score of 25% or less for the [facility] to assist with reaching prior level of function. [x] Progressing: [] Met: [] Not Met: [] Adjusted  2. Patient will demonstrate increased AROM to 160 deg FE of R shoulder to allow for proper joint functioning as indicated by patients Functional Deficits. [x] Progressing: [] Met: [] Not Met: [] Adjusted  3. Patient will demonstrate an increase in Strength to 4+/5 of R RC to allow for proper functional mobility as indicated by patients Functional Deficits. [x] Progressing: [] Met: [] Not Met: [] Adjusted  4.  Patient will return to 95% of functional activities without increased symptoms or strengthening. Continue 2x/week for 3 more weeks. [x] Continue per plan of care [] Alter current plan (see comments above)  [] Plan of care initiated [] Hold pending MD visit [] Discharge      Electronically signed by:  Clay Myles, PT, DPT, \[facility] [place] [facility]\"" [id number]   Note: If patient does not return for scheduled/ recommended follow up visits, this note will serve as a discharge from care along with most recent update on progress.

## 2021-03-04 ENCOUNTER — HOSPITAL ENCOUNTER (OUTPATIENT)
Dept: PHYSICAL THERAPY | Age: 56
Setting detail: THERAPIES SERIES
Discharge: HOME | End: 2021-03-04
Payer: COMMERCIAL

## 2021-03-04 RX ORDER — CYCLOBENZAPRINE HCL 10 MG
TABLET ORAL
Qty: 40 TABLET | Refills: 1 | Status: SHIPPED | OUTPATIENT
Start: 2021-03-04

## 2021-03-04 NOTE — FLOWSHEET NOTE
[facility],  [address]        Physical Therapy  Cancellation/No-show Note  Patient Name:  Sharad Swanson  :  1965   Date:  3/4/2021  Cancelled visits to date: 7  No-shows to date: 1    For today's appointment patient:  [x]  Cancelled  []  Rescheduled appointment  []  No-show     Reason given by patient:  []  Patient ill  []  Conflicting appointment  []  No transportation    []  Conflict with work  [x]  No reason given  []  Other:      Comments:       Electronically signed by:  Del Matthew, PT, DPT, [address]

## 2021-03-09 ENCOUNTER — HOSPITAL ENCOUNTER (OUTPATIENT)
Dept: PHYSICAL THERAPY | Age: 56
Setting detail: THERAPIES SERIES
Discharge: HOME | End: 2021-03-09
Payer: COMMERCIAL

## 2021-03-09 PROCEDURE — G0283 ELEC STIM OTHER THAN WOUND: HCPCS

## 2021-03-09 PROCEDURE — 97112 NEUROMUSCULAR REEDUCATION: CPT

## 2021-03-09 PROCEDURE — 97110 THERAPEUTIC EXERCISES: CPT

## 2021-03-09 NOTE — FLOWSHEET NOTE
[facility],  [address]  Phone: [phone]  Fax [phone]    Physical Therapy Daily Treatment Note        Date:  3/9/2021    Patient Name:  Quinn Sharma    :  1965  MRN: [id number]  Restrictions/Precautions:    Medical/Treatment Diagnosis Information:  · Diagnosis: Z98.890 s/p arthroscopy of the shoulder  · Treatment Diagnosis: M25.511 Right shoulder pain  Insurance/Certification information:  PT Insurance Information: [place]  Physician Information:  Referring Practitioner: FER Hong  Has the plan of care been signed (Y/N):        []  Yes  [x]  No     Date of Patient follow up with Physician: 2021      Is this a Progress Report:     [x]  Yes  []  No        If Yes:  Date Range for reporting period:  Beginning 21  Ending 21    Progress report will be due (10 Rx or 30 days whichever is less): 20 Next progress on 21; done       Recertification will be due (POC Duration  / 90 days whichever is less): 20 done 21     Visit # Insurance Allowable Auth Required   In-person 12 30 []  Yes []  No    Telehealth     []  Yes []  No    Total            Functional Scale: QuickDASH: 33; 50% disability  Date assessed:  20      Therapy Diagnosis/Practice Pattern: I      Number of Comorbidities:  []0     [x]1-2    []3+     Latex Allergy:  [x]NO      []YES  Preferred Language for Healthcare:   [x]English       []other:      Pain level:  5/10 Ibuprofen taken this morning at 5:30AM     SUBJECTIVE:  Pt notes soreness this morning after sleeping on her shoulder last night.  Otherwise notes no issues      OBJECTIVE:     Observation: compensatory movements of the R trunk and neck during AROM of R GHJ with SB and UT over utilization  Test measurements:  See below     PT Practice Pattern: I  Co morbidities: 1-2  surgical   INITIAL VISIT  CURRENT VISIT  21   PAIN 3-5/10 5/10   FUNCTIONAL SCALE QuickDASH (33) 50% disability  (25) 69%    AROM R shoulder flx 80 deg 140 deg pain     abd 75 deg 140 deg      ER 0 deg 30 deg in scapular plane     IR 50 deg 70 deg                    STRENGHT (MMT) R shoulder        flx NT  3/5     abd NT  3/5     ER NT  3/5     IR NT  4/5                         RESTRICTIONS/PRECAUTIONS:  s/p R mini open RC repair, arthroscopy, Neer acromioplasty and debridement DOS 11/24/2020; Use 3/3/3 Protocol; Beginning 12/15 pt can transition to [facility]; Progress to AROM on 1/5/21     Exercises/Interventions:   Angel Champion: [id number] see scanned forms    Therapeutic Ex (06138) Sets/sec Reps Notes/CUES   scap retraction 5\"Hx10      X HEP    10\"x10  X HEP    X HEP    X HEP    X HEP       5\"x10  X HEP    10\"Hx10  Pain free           5\"Hx15      10x10\"      10x5\"  X 12/28   10x10\"  X 12/28   5x5\"  ea  X 12/28   Corner pec stretch 10\"x10  VC for gentle stretch   2x10  YTB   2x10  YTB         SL ER 2x10  1# sig fatigue; limited ROM   SL ABD 2x10   1#    2x15 ea  Fatigued following   Trustretch shoulder flx stretch 10\"x10  In slight ER to reduce impingement    Star drill 3D at wall YTB 10x ea                 Finisher ladders, RUE cross corner, arc swipe with LUE stationary  R/L stagger stance  10x ea  3#               Reassessment for PN and to determine POC and progression of treatment and therex x8'     Pt education  x15'  Importance of doing HEP in order to progress, lack of progression and tightness/pain; education and placement of TENs         Manual Intervention ([phone])                                        NMR re-education (50401)   CUES NEEDED   Able to perform without pain today      PNF D1/D2 flexion supine YTB 2x10 ea Improved tolerance and ability to progress with resistance on 2nd set   Scaption 10x2 3\"H,   0#, standing; improved form   Scapular lift offs 2x10                              Therapeutic Activity (63969) Therapeutic Exercise and NMR EXR  [x] (01622) Provided verbal/tactile cueing for activities related to strengthening, flexibility, endurance, ROM  for improvements in scapular, scapulothoracic and UE control with self care, reaching, carrying, lifting, house/yardwork, driving/computer work.    [] (38197) Provided verbal/tactile cueing for activities related to improving balance, coordination, kinesthetic sense, posture, motor skill, proprioception  to assist with  scapular, scapulothoracic and UE control with self care, reaching, carrying, lifting, house/yardwork, driving/computer work. Therapeutic Activities:    [] (02886 or 88261) Provided verbal/tactile cueing for activities related to improving balance, coordination, kinesthetic sense, posture, motor skill, proprioception and motor activation to allow for proper function of scapular, scapulothoracic and UE control with self care, carrying, lifting, driving/computer work.      Home Exercise Program:    [x] (83789) Reviewed/Progressed HEP activities related to strengthening, flexibility, endurance, ROM of scapular, scapulothoracic and UE control with self care, reaching, carrying, lifting, house/yardwork, driving/computer work  [] (61896) Reviewed/Progressed HEP activities related to improving balance, coordination, kinesthetic sense, posture, motor skill, proprioception of scapular, scapulothoracic and UE control with self care, reaching, carrying, lifting, house/yardwork, driving/computer work      Manual Treatments:  PROM / STM / Oscillations-Mobs:  G-I, II, III, IV (PA's, Inf., Post.)  [x] (26680) Provided manual therapy to mobilize soft tissue/joints of cervical/CT, scapular GHJ and UE for the purpose of modulating pain, promoting relaxation,  increasing ROM, reducing/eliminating soft tissue swelling/inflammation/restriction, improving soft tissue extensibility and allowing for proper ROM for normal function with self care, reaching, carrying, lifting, house/yardwork, driving/computer work    Modalities: IFC + CP x10'    Charges:  Timed Code Treatment Minutes: 39'   Total Treatment Minutes: 54'          [] EVAL (LOW) 83847   [] EVAL (MOD) 29670   [] EVAL (HIGH) 17288   [] RE-EVAL     [x] SD(69968) x 2    [] IONTO  [x] NMR (32588) x  1   [] VASO  [] Manual (25121) x      [] Other:  [] TA x      [] Mech Traction (73398)  [] ES(attended) (12598)      [x] ES (un) (98860):     GOALS:  Patient stated goal: Patient will be able to style her hair without R shoulder pain. - she is now able to wash and brush her hair but not able to style it independently   [x] Progressing: [] Met: [] Not Met: [] Adjusted    Therapist goals for Patient:   Short Term Goals: To be achieved in: 2 weeks  1. Independent in HEP and progression per patient tolerance, in order to prevent re-injury. [x] Progressing: [x] Met: [] Not Met: [] Adjusted  2. Patient will have a decrease in pain to facilitate improvement in movement, function, and ADLs as indicated by Functional Deficits. [x] Progressing: [] Met: [] Not Met: [] Adjusted    Long Term Goals: To be achieved in: 6 weeks  1. Disability index score of 25% or less for the [facility] to assist with reaching prior level of function. [x] Progressing: [] Met: [] Not Met: [] Adjusted  2. Patient will demonstrate increased AROM to 160 deg FE of R shoulder to allow for proper joint functioning as indicated by patients Functional Deficits. [x] Progressing: [] Met: [] Not Met: [] Adjusted  3. Patient will demonstrate an increase in Strength to 4+/5 of R RC to allow for proper functional mobility as indicated by patients Functional Deficits. [x] Progressing: [] Met: [] Not Met: [] Adjusted  4. Patient will return to 95% of functional activities without increased symptoms or restriction. [x] Progressing: [] Met: [] Not Met: [] Adjusted  5.  Patient will be able to raise her R arm OH without pain (patient specific functional goal)    [x] Progressing: [] Met: [] Not Met: [] Adjusted       Progression Towards Functional goals:  [] Patient is progressing as expected towards functional goals listed. [x] Progression is slowed due to complexities listed. See assessment. [] Progression has been slowed due to co-morbidities. 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 PT discussed with patient importance of compliance with her home program and attendance in PT. Patient plans to schedule for the next 3 weeks. Patient would benefit from cont skilled PT in order to progress towards full return to ADLs and functional mobility activities. Overall Progression Towards Functional goals/ Treatment Progress Update:  [] Patient is progressing as expected towards functional goals listed. [x] Progression is slowed due to complexities/Impairments listed. [] Progression has been slowed due to co-morbidities. [] Plan just implemented, too soon to assess goals progression <30days   [] Goals require adjustment due to lack of progress  [] Patient is not progressing as expected and requires additional follow up with physician  [] Other    Prognosis for POC: [x] Good [] Fair  [] Poor      Patient requires continued skilled intervention: [x] Yes  [] No    Treatment/Activity Tolerance:  [x] Patient able to complete treatment  [x] Patient limited by fatigue  [x] Patient limited by pain    [] Patient limited by other medical complications  [] Other:             PLAN: Cont R shoulder AROM and scapular/shoulder functional strengthening. Continue 2x/week for 3 more weeks.     [x] Continue per plan of care [] Alter current plan (see comments above)  [] Plan of care initiated [] Hold pending MD visit [] Discharge      Electronically signed by:  Dora Rivero, PT, DPT, OCS [id number]   Note: If patient does not return for scheduled/ recommended follow up visits, this note will serve as a discharge from care along with most recent update on progress.

## 2021-03-11 ENCOUNTER — HOSPITAL ENCOUNTER (OUTPATIENT)
Dept: PHYSICAL THERAPY | Age: 56
Setting detail: THERAPIES SERIES
Discharge: HOME | End: 2021-03-11
Payer: COMMERCIAL

## 2021-03-11 PROCEDURE — 97140 MANUAL THERAPY 1/> REGIONS: CPT

## 2021-03-11 PROCEDURE — G0283 ELEC STIM OTHER THAN WOUND: HCPCS

## 2021-03-11 PROCEDURE — 97110 THERAPEUTIC EXERCISES: CPT

## 2021-03-11 PROCEDURE — 97112 NEUROMUSCULAR REEDUCATION: CPT

## 2021-03-11 NOTE — FLOWSHEET NOTE
[facility],  [address]  Phone: [phone]  Fax [phone]    Physical Therapy Daily Treatment Note        Date:  3/11/2021    Patient Name:  Wood Franco    :  1965  MRN: [id number]  Restrictions/Precautions:    Medical/Treatment Diagnosis Information:  · Diagnosis: Z98.890 s/p arthroscopy of the shoulder  · Treatment Diagnosis: M25.511 Right shoulder pain  Insurance/Certification information:  PT Insurance Information: [facility] [address] [place] [address]  Physician Information:  Referring Practitioner: FER Salomon  Has the plan of care been signed (Y/N):        []  Yes  [x]  No     Date of Patient follow up with Physician: 2021      Is this a Progress Report:     [x]  Yes  []  No        If Yes:  Date Range for reporting period:  Beginning 21  Ending 21    Progress report will be due (10 Rx or 30 days whichever is less):  done 21; Next progress on 3/94/43      Recertification will be due (POC Duration  / 90 days whichever is less): 20 done 21     Visit # Insurance Allowable Auth Required   In-person 13 30 []  Yes []  No    Telehealth     []  Yes []  No    Total            Functional Scale: QuickDASH: 33; 50% disability  Date assessed:  20      Therapy Diagnosis/Practice Pattern: I      Number of Comorbidities:  []0     [x]1-2    []3+     Latex Allergy:  [x]NO      []YES  Preferred Language for Healthcare:   [x]English       []other:      Pain level:  2/10 Ibuprofen taken this morning at 5:30AM     SUBJECTIVE:  Patient reports minimal soreness this morning.  She took ibuprofen as a precaution anticipating some soreness after PT.       OBJECTIVE:     Observation: compensatory movements of the R trunk and neck during AROM of R GHJ with SB and UT over utilization  Test measurements:  See below     PT Practice Pattern: I  Co morbidities: 1-2  surgical   INITIAL VISIT  CURRENT VISIT  2/24/21   PAIN  3-5/10 5/10   FUNCTIONAL SCALE QuickDASH (33) 50% disability  (25) 69%    AROM R shoulder flx 80 deg 140 deg pain     abd 75 deg 140 deg      ER 0 deg 30 deg in scapular plane     IR 50 deg 70 deg                    STRENGHT (MMT) R shoulder        flx NT  3/5     abd NT  3/5     ER NT  3/5     IR NT  4/5                         RESTRICTIONS/PRECAUTIONS:  s/p R mini open RC repair, arthroscopy, Neer acromioplasty and debridement DOS 11/24/2020; Use 3/3/3 Protocol; Beginning 12/15 pt can transition to [facility]; Progress to AROM on 1/5/21     Exercises/Interventions:   [address] [place] [address] Street: [id number] see scanned forms    Therapeutic Ex (17745) Sets/sec Reps Notes/CUES   scap retraction 5\"Hx10      X HEP    10\"x10  X HEP    X HEP    X HEP    X HEP       5\"x10  X HEP    10\"Hx10  Pain free           5\"Hx15      10x10\"      10x5\"  X 12/28   10x10\"  X 12/28   5x5\"  ea  X 12/28   Corner pec stretch 10\"x10  VC for gentle stretch   2x10  YTB   2x10  YTB         SL ER 2x10  1# sig fatigue; limited ROM   SL ABD 2x10   1#    2x15 ea  Fatigued following   Trustretch shoulder flx stretch 10\"x10  In slight ER to reduce impingement    Star drill 3D at wall YTB 10x ea     trustretch ER stretch 10\"x10           Finisher ladders, RUE cross corner, arc swipe with LUE stationary  R/L stagger stance  10x ea  3#               Reassessment for PN and to determine POC and progression of treatment and therex       Pt education   5'  Importance of doing HEP in order to progress, lack of progression and tightness/pain; education and placement of TENs         Manual Intervention (49101) 12' total     STM, TrPR to R RC and bicep GrI-III GHJ mobs/distraction/oscillations, PROM R shoulder all directions, post capsule stretch, UT TPR                                 NMR re-education (25328)   CUES NEEDED   Scapular wall slides w towel 3D 5\"Hx10 ea  Able to perform without pain today       2x10 ea Improved tolerance and ability to progress promoting relaxation,  increasing ROM, reducing/eliminating soft tissue swelling/inflammation/restriction, improving soft tissue extensibility and allowing for proper ROM for normal function with self care, reaching, carrying, lifting, house/yardwork, driving/computer work    Modalities: IFC + CP x10'    Charges:  Timed Code Treatment Minutes: 40'   Total Treatment Minutes: 50'          [] EVAL (LOW) 10433   [] EVAL (MOD) 83275   [] EVAL (HIGH) 71352   [] RE-EVAL     [x] AE(31891) x 1    [] IONTO  [x] NMR (09290) x  1   [] VASO  [x] Manual (56277) x 1     [] Other:  [] TA x      [] Mech Traction (93975)  [] ES(attended) (18172)      [x] ES (un) (69090): IFC    GOALS:  Patient stated goal: Patient will be able to style her hair without R shoulder pain. - she is now able to wash and brush her hair but not able to style it independently   [x] Progressing: [] Met: [] Not Met: [] Adjusted    Therapist goals for Patient:   Short Term Goals: To be achieved in: 2 weeks  1. Independent in HEP and progression per patient tolerance, in order to prevent re-injury. [x] Progressing: [x] Met: [] Not Met: [] Adjusted  2. Patient will have a decrease in pain to facilitate improvement in movement, function, and ADLs as indicated by Functional Deficits. [x] Progressing: [] Met: [] Not Met: [] Adjusted    Long Term Goals: To be achieved in: 6 weeks  1. Disability index score of 25% or less for the [facility] to assist with reaching prior level of function. [x] Progressing: [] Met: [] Not Met: [] Adjusted  2. Patient will demonstrate increased AROM to 160 deg FE of R shoulder to allow for proper joint functioning as indicated by patients Functional Deficits. [x] Progressing: [] Met: [] Not Met: [] Adjusted  3. Patient will demonstrate an increase in Strength to 4+/5 of R RC to allow for proper functional mobility as indicated by patients Functional Deficits. [x] Progressing: [] Met: [] Not Met: [] Adjusted  4.  Patient will return to 95% of functional activities without increased symptoms or restriction. [x] Progressing: [] Met: [] Not Met: [] Adjusted  5. Patient will be able to raise her R arm OH without pain (patient specific functional goal)    [x] Progressing: [] Met: [] Not Met: [] Adjusted       Progression Towards Functional goals:  [] Patient is progressing as expected towards functional goals listed. [x] Progression is slowed due to complexities listed. See assessment. [] Progression has been slowed due to co-morbidities. 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 Patient is developing limitations in a capsular pattern and should be monitored for development of adhesive capsulitis. PT discussed with patient importance of compliance with her home program and attendance in PT. Patient plans to schedule for the next 3 weeks. Patient would benefit from cont skilled PT in order to progress towards full return to ADLs and functional mobility activities. Overall Progression Towards Functional goals/ Treatment Progress Update:  [] Patient is progressing as expected towards functional goals listed. [x] Progression is slowed due to complexities/Impairments listed. [] Progression has been slowed due to co-morbidities.   [] Plan just implemented, too soon to assess goals progression <30days   [] Goals require adjustment due to lack of progress  [] Patient is not progressing as expected and requires additional follow up with physician  [] Other    Prognosis for POC: [x] Good [] Fair  [] Poor      Patient requires continued skilled intervention: [x] Yes  [] No    Treatment/Activity Tolerance:  [x] Patient able to complete treatment  [x] Patient limited by fatigue  [x] Patient limited by pain    [] Patient limited by other medical complications  [] Other:             PLAN: Cont R shoulder AROM and scapular/shoulder functional strengthening. Continue 2x/week for 3 more weeks. [x] Continue per plan of care [] Alter current plan (see comments above)  [] Plan of care initiated [] Hold pending MD visit [] Discharge      Electronically signed by:  Aba Alexander, PT, DPT, OCS [id number]   Note: If patient does not return for scheduled/ recommended follow up visits, this note will serve as a discharge from care along with most recent update on progress.

## 2021-03-18 ENCOUNTER — HOSPITAL ENCOUNTER (OUTPATIENT)
Dept: PHYSICAL THERAPY | Age: 56
Setting detail: THERAPIES SERIES
Discharge: HOME | End: 2021-03-18
Payer: COMMERCIAL

## 2021-03-18 PROCEDURE — G0283 ELEC STIM OTHER THAN WOUND: HCPCS

## 2021-03-18 PROCEDURE — 97140 MANUAL THERAPY 1/> REGIONS: CPT

## 2021-03-18 PROCEDURE — 97110 THERAPEUTIC EXERCISES: CPT

## 2021-03-18 NOTE — FLOWSHEET NOTE
[facility],  [address]  Phone: [phone]  Fax [phone]    Physical Therapy Daily Treatment Note        Date:  3/18/2021    Patient Name:  Neisha Ugalde    :  1965  MRN: [id number]  Restrictions/Precautions:    Medical/Treatment Diagnosis Information:  · Diagnosis: Z98.890 s/p arthroscopy of the shoulder  · Treatment Diagnosis: M25.511 Right shoulder pain  Insurance/Certification information:  PT Insurance Information: [id number] [place] [address]  Physician Information:  Referring Practitioner: [facility] Rosen  Has the plan of care been signed (Y/N):        []  Yes  [x]  No     Date of Patient follow up with Physician: 2021      Is this a Progress Report:     [x]  Yes  []  No        If Yes:  Date Range for reporting period:  Beginning 21  Ending 21    Progress report will be due (10 Rx or 30 days whichever is less):  done 21; Next progress on       Recertification will be due (POC Duration  / 90 days whichever is less): 20 done 21     Visit # Insurance Allowable Auth Required   In-person 14 30 []  Yes []  No    Telehealth     []  Yes []  No    Total            Functional Scale: QuickDASH: 33; 50% disability  Date assessed:  20      Therapy Diagnosis/Practice Pattern: I      Number of Comorbidities:  []0     [x]1-2    []3+     Latex Allergy:  [x]NO      []YES  Preferred Language for Healthcare:   [x]English       []other:      Pain level:  1/10 Ibuprofen taken this morning at 5:30AM     SUBJECTIVE:  Patient reports she had a new house yesterday to clean and now has increased tightness and soreness along the top of her shoulder. 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       OBJECTIVE:     Observation: compensatory movements of the R trunk and neck during AROM of R GHJ with SB and UT over utilization  Test capsule stretch, UT TPR                                 NMR re-education (49602)   CUES NEEDED   Scapular wall slides w towel 3D 5\"Hx10 ea  Able to perform without pain today      PNF D1/D2 flexion supine YTB 2x10 ea Improved tolerance and ability to progress with resistance on 2nd set   Scaption 10x2 3\"H,   0#, standing; improved form with mirror    Scapular lift offs 2x10   Sig fatigue                           Therapeutic Activity (67876)                                                Therapeutic Exercise and NMR EXR  [x] (35378) Provided verbal/tactile cueing for activities related to strengthening, flexibility, endurance, ROM  for improvements in scapular, scapulothoracic and UE control with self care, reaching, carrying, lifting, house/yardwork, driving/computer work.    [] (11562) Provided verbal/tactile cueing for activities related to improving balance, coordination, kinesthetic sense, posture, motor skill, proprioception  to assist with  scapular, scapulothoracic and UE control with self care, reaching, carrying, lifting, house/yardwork, driving/computer work. Therapeutic Activities:    [] (12555 or 77145) Provided verbal/tactile cueing for activities related to improving balance, coordination, kinesthetic sense, posture, motor skill, proprioception and motor activation to allow for proper function of scapular, scapulothoracic and UE control with self care, carrying, lifting, driving/computer work.      Home Exercise Program:    [x] (11211) Reviewed/Progressed HEP activities related to strengthening, flexibility, endurance, ROM of scapular, scapulothoracic and UE control with self care, reaching, carrying, lifting, house/yardwork, driving/computer work  [] (59617) Reviewed/Progressed HEP activities related to improving balance, coordination, kinesthetic sense, posture, motor skill, proprioception of scapular, scapulothoracic and UE control with self care, reaching, carrying, lifting, house/yardwork, driving/computer work      Manual Treatments:  PROM / STM / Oscillations-Mobs:  G-I, II, III, IV (PA's, Inf., Post.)  [x] (19613) Provided manual therapy to mobilize soft tissue/joints of cervical/CT, scapular GHJ and UE for the purpose of modulating pain, promoting relaxation,  increasing ROM, reducing/eliminating soft tissue swelling/inflammation/restriction, improving soft tissue extensibility and allowing for proper ROM for normal function with self care, reaching, carrying, lifting, house/yardwork, driving/computer work    Modalities: IFC + CP x10'    Charges:  Timed Code Treatment Minutes: 40'   Total Treatment Minutes: 48'          [] EVAL (LOW) 10883   [] EVAL (MOD) 97104   [] EVAL (HIGH) 79909   [] RE-EVAL     [x] GM(52694) x 1    [] IONTO  [] NMR (77525) x    [] VASO  [x] Manual (39822) x 2     [] Other:  [] TA x      [] Mech Traction (55973)  [] ES(attended) (96997)      [x] ES (un) (56135): IFC    GOALS:  Patient stated goal: Patient will be able to style her hair without R shoulder pain. - she is now able to wash and brush her hair but not able to style it independently   [x] Progressing: [] Met: [] Not Met: [] Adjusted    Therapist goals for Patient:   Short Term Goals: To be achieved in: 2 weeks  1. Independent in HEP and progression per patient tolerance, in order to prevent re-injury. [x] Progressing: [x] Met: [] Not Met: [] Adjusted  2. Patient will have a decrease in pain to facilitate improvement in movement, function, and ADLs as indicated by Functional Deficits. [x] Progressing: [] Met: [] Not Met: [] Adjusted    Long Term Goals: To be achieved in: 6 weeks  1. Disability index score of 25% or less for the [facility] to assist with reaching prior level of function. [x] Progressing: [] Met: [] Not Met: [] Adjusted  2. Patient will demonstrate increased AROM to 160 deg FE of R shoulder to allow for proper joint functioning as indicated by patients Functional Deficits.    [x] Progressing: [] Met: [] Not Met: [] Adjusted  3. Patient will demonstrate an increase in Strength to 4+/5 of R RC to allow for proper functional mobility as indicated by patients Functional Deficits. [x] Progressing: [] Met: [] Not Met: [] Adjusted  4. Patient will return to 95% of functional activities without increased symptoms or restriction. [x] Progressing: [] Met: [] Not Met: [] Adjusted  5. Patient will be able to raise her R arm OH without pain (patient specific functional goal)    [x] Progressing: [] Met: [] Not Met: [] Adjusted       Progression Towards Functional goals:  [] Patient is progressing as expected towards functional goals listed. [x] Progression is slowed due to complexities listed. See assessment. [] Progression has been slowed due to co-morbidities. [] Plan just implemented, too soon to assess goals progression  [] Other:     ASSESSMENT:  Patient seemed to be over utilizing her R UT and bicep this date after having a longer and more demanding day at work yesterday while using her R arm. She had difficulty tolerating exercises this session due to R bicep cramping though this did improve with STM. 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 Patient is developing limitations in a capsular pattern and should be monitored for development of adhesive capsulitis. PT discussed with patient importance of compliance with her home program and attendance in PT. Patient plans to schedule for the next 3 weeks. Patient would benefit from cont skilled PT in order to progress towards full return to ADLs and functional mobility activities. Overall Progression Towards Functional goals/ Treatment Progress Update:  [] Patient is progressing as expected towards functional goals listed.     [x] Progression is slowed due to complexities/Impairments listed. [] Progression has been slowed due to co-morbidities. [] Plan just implemented, too soon to assess goals progression <30days   [] Goals require adjustment due to lack of progress  [] Patient is not progressing as expected and requires additional follow up with physician  [] Other    Prognosis for POC: [x] Good [] Fair  [] Poor      Patient requires continued skilled intervention: [x] Yes  [] No    Treatment/Activity Tolerance:  [x] Patient able to complete treatment  [x] Patient limited by fatigue  [x] Patient limited by pain    [] Patient limited by other medical complications  [] Other:             PLAN: Cont R shoulder AROM and scapular/shoulder functional strengthening. Continue 2x/week for 3 more weeks. [x] Continue per plan of care [] Alter current plan (see comments above)  [] Plan of care initiated [] Hold pending MD visit [] Discharge      Electronically signed by:  Sheila Coley, PT, DPT, OCS [id number]   Note: If patient does not return for scheduled/ recommended follow up visits, this note will serve as a discharge from care along with most recent update on progress.

## 2021-03-23 ENCOUNTER — HOSPITAL ENCOUNTER (OUTPATIENT)
Dept: PHYSICAL THERAPY | Age: 56
Setting detail: THERAPIES SERIES
Discharge: HOME | End: 2021-03-23
Payer: COMMERCIAL

## 2021-03-23 PROCEDURE — 97112 NEUROMUSCULAR REEDUCATION: CPT

## 2021-03-23 PROCEDURE — 97110 THERAPEUTIC EXERCISES: CPT

## 2021-03-23 PROCEDURE — G0283 ELEC STIM OTHER THAN WOUND: HCPCS

## 2021-03-23 NOTE — PROGRESS NOTES
[facility],  [address]  Phone: [phone]  Fax [phone]    Physical Therapy Daily Treatment Note        Date:  3/23/2021    Patient Name:  Vishal Llamas    :  1965  MRN: [id number]  Restrictions/Precautions:    Medical/Treatment Diagnosis Information:  · Diagnosis: Z98.890 s/p arthroscopy of the shoulder  · Treatment Diagnosis: M25.511 Right shoulder pain  Insurance/Certification information:  PT Insurance Information: [email or web address]  Physician Information:  Referring Practitioner: [facility] [place]  Has the plan of care been signed (Y/N):        []  Yes  [x]  No     Date of Patient follow up with Physician: 2021      Is this a Progress Report:     [x]  Yes  []  No        If Yes:  Date Range for reporting period:  Beginning 21  Ending 3/23/21    Progress report will be due (10 Rx or 30 days whichever is less):  done 3/23/21; Next progress on       Recertification will be due (POC Duration  / 90 days whichever is less): 20 done 21     Visit # Insurance Allowable Auth Required   In-person 15 30 []  Yes []  No    Telehealth     []  Yes []  No    Total            Functional Scale: QuickDASH: 33; 50% disability  Date assessed:  20      Therapy Diagnosis/Practice Pattern: I      Number of Comorbidities:  []0     [x]1-2    []3+     Latex Allergy:  [x]NO      []YES  Preferred Language for Healthcare:   [x]English       []other:      Pain level:  0/10      SUBJECTIVE:  Patient reports some continued soreness in her shoulder especially after using it more during household or work-related activities.        OBJECTIVE:     Observation: significantly limited ER strength during MMT this date (3/23); compensatory movements of the R trunk and neck during AROM of R GHJ with SB and UT over utilization  Test measurements:  See below     PT Practice Pattern: I  Co morbidities: 1-2  surgical   INITIAL VISIT  CURRENT VISIT  3/23/21   PAIN  3-5/10 0/10   FUNCTIONAL SCALE QuickDASH (33) 50% disability  (14) 7%    AROM R shoulder flx 80 deg 145 deg pain     abd 75 deg 145 deg      ER 0 deg 50 deg @ 90 deg abd      IR 50 deg 45 deg @90 deg abd                   STRENGHT (MMT) R shoulder        flx NT  4-/5     abd NT  4-/5     ER NT  3-/5     IR NT  4/5                         RESTRICTIONS/PRECAUTIONS:  s/p R mini open RC repair, arthroscopy, Neer acromioplasty and debridement DOS 11/24/2020; Use 3/3/3 Protocol; Beginning 12/15 pt can transition to [facility]; Progress to AROM on 1/5/21     Exercises/Interventions:   [address] [place] [address] Street: [id number] see scanned forms    Therapeutic Ex (84186) Sets/sec Reps Notes/CUES   scap retraction 5\"Hx10     Seated UT stretch 30\"x3  X HEP    10\"x10  X HEP    X HEP    X HEP    X HEP       5\"x10  X HEP   Pulleys scaption 10\"Hx10  Pain free ; warm up          5\"Hx15      10x10\"      10x5\"  X 12/28   10x10\"  X 12/28   No money [id number]\"         Corner pec stretch 10\"x10  VC for gentle stretch   2x10  YTB   2x10  YTB         SL ER 2x10  sig fatigue; limited ROM and decreased tolerance since previous visits   SL ABD 2x10   0#    2x15 ea  Fatigued following   Trustretch B shoulder flx stretch 10\"x10  In slight ER to reduce impingement    Star drill 3D at wall YTB 10x ea  Difficulty this date due to R bicep pain/tightness    10\"x10           Finisher ladders, RUE cross corner, arc swipe with LUE stationary  R/L stagger stance  10x ea      Trustretch scap retraction 5\"Hx10      Sleeper stretch 10\"x10           Reassessment for PN and to determine POC and progression of treatment and therex  5'     Pt education   5'  Importance of doing HEP in order to progress, lack of progression and tightness/pain; education and placement of TENs         Manual Intervention ([phone])                                        NMR re-education (83938)   CUES NEEDED   Scapular wall slides w towel 3D 5\"Hx10 ea  Able to perform without pain today      PNF D1/D2 flexion supine and seated x10 supine; seated 3x5   No resistance used this date with progression to sitting; fatigued following   Scaption 10x2 3\"H,   0#, standing; improved form with mirror    Scapular lift offs 2x10   Sig fatigue                           Therapeutic Activity (44877)                                                Therapeutic Exercise and NMR EXR  [x] (81640) Provided verbal/tactile cueing for activities related to strengthening, flexibility, endurance, ROM  for improvements in scapular, scapulothoracic and UE control with self care, reaching, carrying, lifting, house/yardwork, driving/computer work.    [] (69602) Provided verbal/tactile cueing for activities related to improving balance, coordination, kinesthetic sense, posture, motor skill, proprioception  to assist with  scapular, scapulothoracic and UE control with self care, reaching, carrying, lifting, house/yardwork, driving/computer work. Therapeutic Activities:    [] (68267 or 73143) Provided verbal/tactile cueing for activities related to improving balance, coordination, kinesthetic sense, posture, motor skill, proprioception and motor activation to allow for proper function of scapular, scapulothoracic and UE control with self care, carrying, lifting, driving/computer work.      Home Exercise Program:    [x] (58192) Reviewed/Progressed HEP activities related to strengthening, flexibility, endurance, ROM of scapular, scapulothoracic and UE control with self care, reaching, carrying, lifting, house/yardwork, driving/computer work  [] (83217) Reviewed/Progressed HEP activities related to improving balance, coordination, kinesthetic sense, posture, motor skill, proprioception of scapular, scapulothoracic and UE control with self care, reaching, carrying, lifting, house/yardwork, driving/computer work      Manual Treatments:  PROM / STM / Oscillations-Mobs:  G-I, II, III, IV (PA's, Inf., Post.)  [x] (48328) Provided manual therapy to mobilize soft tissue/joints of cervical/CT, scapular GHJ and UE for the purpose of modulating pain, promoting relaxation,  increasing ROM, reducing/eliminating soft tissue swelling/inflammation/restriction, improving soft tissue extensibility and allowing for proper ROM for normal function with self care, reaching, carrying, lifting, house/yardwork, driving/computer work    Modalities: IFC + CP x10'    Charges:  Timed Code Treatment Minutes: 39'   Total Treatment Minutes: 54'          [] EVAL (LOW) 71023   [] EVAL (MOD) 94358   [] EVAL (HIGH) 34967   [] RE-EVAL     [x] OI(28710) x 2    [] IONTO  [x] NMR (81128) x 1   [] VASO  [] Manual (39140) x      [] Other:  [] TA x      [] Mech Traction (80781)  [] ES(attended) (80353)      [x] ES (un) (34881): IFC    GOALS:  Patient stated goal: Patient will be able to style her hair without R shoulder pain. - she is now able to wash and brush her hair but not able to style it independently   [x] Progressing: [] Met: [] Not Met: [] Adjusted    Therapist goals for Patient:   Short Term Goals: To be achieved in: 2 weeks  1. Independent in HEP and progression per patient tolerance, in order to prevent re-injury. [] Progressing: [x] Met: [] Not Met: [] Adjusted  2. Patient will have a decrease in pain to facilitate improvement in movement, function, and ADLs as indicated by Functional Deficits. [x] Progressing: [] Met: [] Not Met: [] Adjusted    Long Term Goals: To be achieved in: 6 weeks  1. Disability index score of 25% or less for the [facility] to assist with reaching prior level of function. [] Progressing: [x] Met: [] Not Met: [] Adjusted  2. Patient will demonstrate increased AROM to 160 deg FE of R shoulder to allow for proper joint functioning as indicated by patients Functional Deficits. [x] Progressing: [] Met: [] Not Met: [] Adjusted  3.  Patient will demonstrate an increase in Strength to 4+/5 of R RC to allow for

## 2021-03-25 ENCOUNTER — HOSPITAL ENCOUNTER (OUTPATIENT)
Dept: PHYSICAL THERAPY | Age: 56
Setting detail: THERAPIES SERIES
Discharge: HOME | End: 2021-03-25
Payer: COMMERCIAL

## 2021-03-25 PROCEDURE — 97110 THERAPEUTIC EXERCISES: CPT | Performed by: PHYSICAL THERAPIST

## 2021-03-25 PROCEDURE — 97140 MANUAL THERAPY 1/> REGIONS: CPT | Performed by: PHYSICAL THERAPIST

## 2021-03-25 PROCEDURE — G0283 ELEC STIM OTHER THAN WOUND: HCPCS | Performed by: PHYSICAL THERAPIST

## 2021-03-25 NOTE — FLOWSHEET NOTE
[facility],  [address]  Phone: [phone]  Fax [phone]    Physical Therapy Daily Treatment Note        Date:  3/25/2021    Patient Name:  Mayur Apodaca    :  1965  MRN: [id number]  Restrictions/Precautions:    Medical/Treatment Diagnosis Information:  · Diagnosis: Z98.890 s/p arthroscopy of the shoulder  · Treatment Diagnosis: M25.511 Right shoulder pain  Insurance/Certification information:  PT Insurance Information: [employer]  Physician Information:  Referring Practitioner: [facility]ER [place]  Has the plan of care been signed (Y/N):        []  Yes  [x]  No     Date of Patient follow up with Physician: 2021      Is this a Progress Report:     [x]  Yes  []  No        If Yes:  Date Range for reporting period:  Beginning 3/23/2021  Ending     Progress report will be due (10 Rx or 30 days whichever is less):  71      Recertification will be due (POC Duration  / 90 days whichever is less): 2021     Visit # Insurance Allowable Auth Required   In-person 16 30 []  Yes []  No    Telehealth     []  Yes []  No    Total            Functional Scale: QuickDASH: 14/55; 7% disability  Date assessed:  3/23/2021      Therapy Diagnosis/Practice Pattern: I      Number of Comorbidities:  []0     [x]1-2    []3+     Latex Allergy:  [x]NO      []YES  Preferred Language for Healthcare:   [x]English       []other:      Pain level:  0-1/10      SUBJECTIVE:  Patient reports her shoulder does not bother her much except when she moves it a certain way, then she gets a sharp pain. OBJECTIVE:     Observation: continued weakness with ER strength this date, per patient it is improved from last visit.    Palpation: tightness posterior cuff, upper trap, pec  Test measurements:  See below     PT Practice Pattern: I  Co morbidities: 1-2  surgical   INITIAL VISIT  CURRENT VISIT  3/23/21   PAIN  3-5/10 0/10 coordination, kinesthetic sense, posture, motor skill, proprioception  to assist with  scapular, scapulothoracic and UE control with self care, reaching, carrying, lifting, house/yardwork, driving/computer work. Therapeutic Activities:    [] (02487 or 33105) Provided verbal/tactile cueing for activities related to improving balance, coordination, kinesthetic sense, posture, motor skill, proprioception and motor activation to allow for proper function of scapular, scapulothoracic and UE control with self care, carrying, lifting, driving/computer work.      Home Exercise Program:    [x] (42418) Reviewed/Progressed HEP activities related to strengthening, flexibility, endurance, ROM of scapular, scapulothoracic and UE control with self care, reaching, carrying, lifting, house/yardwork, driving/computer work  [] (61737) Reviewed/Progressed HEP activities related to improving balance, coordination, kinesthetic sense, posture, motor skill, proprioception of scapular, scapulothoracic and UE control with self care, reaching, carrying, lifting, house/yardwork, driving/computer work      Manual Treatments:  PROM / STM / Oscillations-Mobs:  G-I, II, III, IV (PA's, Inf., Post.)  [x] (56042) Provided manual therapy to mobilize soft tissue/joints of cervical/CT, scapular GHJ and UE for the purpose of modulating pain, promoting relaxation,  increasing ROM, reducing/eliminating soft tissue swelling/inflammation/restriction, improving soft tissue extensibility and allowing for proper ROM for normal function with self care, reaching, carrying, lifting, house/yardwork, driving/computer work    Modalities: IFC + CP x15'    Charges:  Timed Code Treatment Minutes: 45'   Total Treatment Minutes: 61'          [] EVAL (LOW) 30361   [] EVAL (MOD) 21211   [] EVAL (HIGH) 95673   [] RE-EVAL     [x] KD(48206) x 2    [] IONTO  [] NMR (89579) x    [] VASO  [x] Manual (81262) x  1    [] Other:  [] TA x      [] Mech Traction (12411)  [] Patient demonstrates tightness in ER and IR, as well as TPs noted in pec and posterior cuff. Patient still with significant weakness into ER, but per patient it is improved from last visit. Patient given updated HEP this date and educated on the importance of compliance with HEP. Also discussed getting established with new shoulder specialist as she can no longer see surgeon due to insurance. Overall Progression Towards Functional goals/ Treatment Progress Update:  [] Patient is progressing as expected towards functional goals listed. [x] Progression is slowed due to complexities/Impairments listed. [] Progression has been slowed due to co-morbidities. [] Plan just implemented, too soon to assess goals progression <30days   [] Goals require adjustment due to lack of progress  [] Patient is not progressing as expected and requires additional follow up with physician  [] Other    Prognosis for POC: [x] Good [] Fair  [] Poor      Patient requires continued skilled intervention: [x] Yes  [] No    Treatment/Activity Tolerance:  [x] Patient able to complete treatment  [x] Patient limited by fatigue  [x] Patient limited by pain    [] Patient limited by other medical complications  [] Other:         PLAN: Continue to progress strength and ROM as tolerated     [x] Continue per plan of care [] Alter current plan (see comments above)  [] Plan of care initiated [] Hold pending MD visit [] Discharge      Electronically signed by:  Nataliya Jimenez PT, DPT [id number]   Note: If patient does not return for scheduled/ recommended follow up visits, this note will serve as a discharge from care along with most recent update on progress.

## 2021-03-30 ENCOUNTER — HOSPITAL ENCOUNTER (OUTPATIENT)
Dept: PHYSICAL THERAPY | Age: 56
Setting detail: THERAPIES SERIES
Discharge: HOME | End: 2021-03-30
Payer: COMMERCIAL

## 2021-04-06 ENCOUNTER — HOSPITAL ENCOUNTER (OUTPATIENT)
Dept: PHYSICAL THERAPY | Age: 56
Setting detail: THERAPIES SERIES
Discharge: HOME | End: 2021-04-06
Payer: COMMERCIAL

## 2021-04-14 NOTE — FLOWSHEET NOTE
Forms faxed to [facility] fax # [phone] and sent to stat scan.     Kenia Bowers  TC   [place] [address] [facility],  [address]  Phone: [phone]  Fax [phone]        Date:  2021    Patient Name:  Janes Broussard    :  1965  MRN: [id number]  Restrictions/Precautions:    Medical/Treatment Diagnosis Information:  · Diagnosis: Z98.890 s/p arthroscopy of the shoulder  · Treatment Diagnosis: M25.511 Right shoulder pain  Insurance/Certification information:  PT Insurance Information: Lizzie Ulrich  Physician Information:  Referring Practitioner: [facility] Waters  Has the plan of care been signed (Y/N):        []  Yes  [x]  No     Date of Patient follow up with Physician: 2021      Is this a Progress Report:     []  Yes  [x]  No        If Yes:  Date Range for reporting period:  Beginning 20  Ending     Progress report will be due (10 Rx or 30 days whichever is less):        Recertification will be due (POC Duration  / 90 days whichever is less): 20     Visit # Insurance Allowable Auth Required   In-person 6 30 []  Yes []  No    Telehealth     []  Yes []  No    Total            Functional Scale: QuickDASH: 33; 50% disability  Date assessed:  20      Therapy Diagnosis/Practice Pattern: I      Number of Comorbidities:  []0     [x]1-2    []3+     Latex Allergy:  [x]NO      []YES  Preferred Language for Healthcare:   [x]English       []other:      Pain level:  0/10     SUBJECTIVE:  Patient reports she feels her shoulder is getting better. She now has greater ease with pulling her hair back.         OBJECTIVE: See eval   Observation: ttp of R UT, SS, IS, biceps    Test measurements:       PT Practice Pattern: I  Co morbidities: 1-2  surgical   INITIAL VISIT  CURRENT VISIT  21   PAIN  3-5/10 0/10   FUNCTIONAL SCALE QuickDASH (33) 50% disability   NT   ROM R shoulder flx 80 deg 135 deg      abd 75 deg 110  Deg pain      ER 0 deg  20 deg     IR 50 deg 50 deg STRENGHT (MMT)                                                             RESTRICTIONS/PRECAUTIONS:  s/p R mini open RC repair, arthroscopy, Neer acromioplasty and debridement DOS 11/24/2020; Use 3/3/3 Protocol; Beginning 12/15 pt can transition to [facility]; Progress to AROM on 1/5/21     Exercises/Interventions:   [address] [place] [address] Street: [id number] see scanned forms    Therapeutic Ex (37190) Sets/sec Reps Notes/CUES   scap retraction 5\"Hx10      X HEP   Flexion table slides  10\"x10  X HEP    X HEP    X HEP    X HEP       5\"x10  X HEP   10\"Hx10  Pain free          Cane shoulder flx  5\"Hx15      10x10\"     Supine cane ER @ 30/60 ABD 10x5\"  X 12/28   10x10\"  X 12/28   5x5\"  ea  X 12/28   Corner pec stretch 10\"x10  VC for gentle stretch   Standing rows 2x10  YTB   Shoulder extension 2x10  YTB   Finisher table: flx, V, arcs 10x ea     SL ER 2x10  0#   SL ABD 2x10   0#               Pt education on current condition, POC, expectations for therapy, and HEP as well as precautions and continuation of icing at home 5'            Manual Intervention (36411)      STM, TrPR to R RC and bicep and UT, GrI-II GHJ mobs, PROM R shoulder all directions 8'  Greater ease tolerating PROM into Flx, abd, and ER                              NMR re-education (60989)   CUES NEEDED   Scapular wall slides w towel 3D 5\"Hx10 ea   Fatigued following                                                   Therapeutic Activity (46493)                                                Therapeutic Exercise and NMR EXR  [x] (96779) Provided verbal/tactile cueing for activities related to strengthening, flexibility, endurance, ROM  for improvements in scapular, scapulothoracic and UE control with self care, reaching, carrying, lifting, house/yardwork, driving/computer work.    [] (17957) Provided verbal/tactile cueing for activities related to improving balance, coordination, kinesthetic sense, posture, motor skill, proprioception  to assist with  scapular, scapulothoracic without R shoulder pain. [x] Progressing: [] Met: [] Not Met: [] Adjusted    Therapist goals for Patient:   Short Term Goals: To be achieved in: 2 weeks  1. Independent in HEP and progression per patient tolerance, in order to prevent re-injury. [x] Progressing: [x] Met: [] Not Met: [] Adjusted  2. Patient will have a decrease in pain to facilitate improvement in movement, function, and ADLs as indicated by Functional Deficits. [x] Progressing: [] Met: [] Not Met: [] Adjusted    Long Term Goals: To be achieved in: 6 weeks  1. Disability index score of 25% or less for the [facility] to assist with reaching prior level of function. [] Progressing: [] Met: [] Not Met: [] Adjusted  2. Patient will demonstrate increased AROM to 160 deg FE of R shoulder to allow for proper joint functioning as indicated by patients Functional Deficits. [x] Progressing: [] Met: [] Not Met: [] Adjusted  3. Patient will demonstrate an increase in Strength to 4+/5 of R RC to allow for proper functional mobility as indicated by patients Functional Deficits. [x] Progressing: [] Met: [] Not Met: [] Adjusted  4. Patient will return to 95% of functional activities without increased symptoms or restriction. [x] Progressing: [] Met: [] Not Met: [] Adjusted  5. Patient will be able to raise her R arm OH without pain (patient specific functional goal)    [x] Progressing: [] Met: [] Not Met: [] Adjusted       Progression Towards Functional goals:  [] Patient is progressing as expected towards functional goals listed. [] Progression is slowed due to complexities listed. [] Progression has been slowed due to co-morbidities. [x] Plan just implemented, too soon to assess goals progression  [] Other:     ASSESSMENT:  Patient presents with significant restrictions in ER ROM but was able to perform the stretches in the clinic.  She had improved tolerance of PROM and AAROM this visit but continues with significant guarding and pain at end Acitretin Counseling:  I discussed with the patient the risks of acitretin including but not limited to hair loss, dry lips/skin/eyes, liver damage, hyperlipidemia, depression/suicidal ideation, photosensitivity.  Serious rare side effects can include but are not limited to pancreatitis, pseudotumor cerebri, bony changes, clot formation/stroke/heart attack.  Patient understands that alcohol is contraindicated since it can result in liver toxicity and significantly prolong the elimination of the drug by many years.

## (undated) DEVICE — GOWN,SIRUS,NON REINFRCD,LARGE,SET IN SL: Brand: MEDLINE

## (undated) DEVICE — AMBIENT SUPER TURBOVAC 90: Brand: COBLATION

## (undated) DEVICE — TOWEL,OR,DSP,ST,BLUE,STD,4/PK,20PK/CS: Brand: MEDLINE

## (undated) DEVICE — SUTURE VCRL SZ 2-0 L18IN ABSRB UD CT-1 L36MM 1/2 CIR J839D

## (undated) DEVICE — DYONICS 4.0 MM ELITE                                    ACROMIOBLASTER STRAIGHT DISPOSABLE                                    BURRS, SAGE GREEN, 10000 MAXIMUM                                    RPM, PACKAGED 6 PER BOX, STERILE

## (undated) DEVICE — STANDARD HYPODERMIC NEEDLE,POLYPROPYLENE HUB: Brand: MONOJECT

## (undated) DEVICE — PACK COOL L W14XL6.5IN 3 LAYR CONSTR SFT CLP CLSR 4 TIE

## (undated) DEVICE — STERILE POLYISOPRENE POWDER-FREE SURGICAL GLOVES: Brand: PROTEXIS

## (undated) DEVICE — 3M™ STERI-STRIP™ REINFORCED ADHESIVE SKIN CLOSURES, R1547, 1/2 IN X 4 IN (12 MM X 100 MM), 6 STRIPS/ENVELOPE: Brand: 3M™ STERI-STRIP™

## (undated) DEVICE — 1810 FOAM BLOCK NEEDLE COUNTER: Brand: DEVON

## (undated) DEVICE — TOWEL,OR,DSP,ST,BLUE,STD,8/PK,10PK/CS: Brand: MEDLINE

## (undated) DEVICE — SOLUTION IV 1000ML LAC RINGERS PH 6.5 INJ USP VIAFLX PLAS

## (undated) DEVICE — PACK PROCEDURE SURG ARTHSCP CUST

## (undated) DEVICE — DRAPE,U/ SHT,SPLIT,PLAS,STERIL: Brand: MEDLINE

## (undated) DEVICE — GLOVE SURG SZ 65 L12IN FNGR THK94MIL STD WHT LTX FREE

## (undated) DEVICE — TUBING PMP L8FT LNG W/ CONN FOR AR-6400 REDEUCE

## (undated) DEVICE — 3M™ STERI-DRAPE™ U-DRAPE, LONG 1019: Brand: STERI-DRAPE™

## (undated) DEVICE — MEDI-VAC NON-CONDUCTIVE SUCTION TUBING: Brand: CARDINAL HEALTH

## (undated) DEVICE — 4.5 MM FULL RADIUS STRAIGHT                                    BLADES, POWER/EP-1, YELLOW, PACKAGED                                    6 PER BOX, STERILE: Brand: DYONICS

## (undated) DEVICE — GAUZE,SPONGE,4"X4",16PLY,STRL,LF,10/TRAY: Brand: MEDLINE

## (undated) DEVICE — SOLUTION IV IRRIG 500ML 0.9% SODIUM CHL 2F7123

## (undated) DEVICE — CATHETER IV 20GA L1.25IN PNK FEP SFTY STR HUB RADPQ DISP

## (undated) DEVICE — SUTURE VCRL SZ 0 L18IN ABSRB UD L36MM CT-1 1/2 CIR J840D

## (undated) DEVICE — 90403 SHOULDER ARTHROSCOPY KIT: Brand: 90403 SHOULDER ARTHROSCOPY KIT

## (undated) DEVICE — SUTURE MCRYL SZ 4-0 L18IN ABSRB UD L19MM PS-2 3/8 CIR PRIM Y496G

## (undated) DEVICE — PAD,ABDOMINAL,8"X10",ST,LF: Brand: MEDLINE

## (undated) DEVICE — SET ADMIN PRIMING 7ML L30IN 7.35LB 20 GTT 2ND RLER CLMP

## (undated) DEVICE — FLUID CONTROL SHOULDER DRAPE PACK: Brand: CONVERTORS

## (undated) DEVICE — SYRINGE BLB 50CC IRRIG PLIABLE FNGR FLNG GRAD FLSK DISP

## (undated) DEVICE — SET GRAV VENT NVENT CK VLV 3 NDL FREE PRT 10 GTT

## (undated) DEVICE — SKIN MARKER,REGULAR TIP WITH RULER AND LABELS: Brand: DEVON

## (undated) DEVICE — SYRINGE MED 10ML LUERLOCK TIP W/O SFTY DISP

## (undated) DEVICE — 3M™ MEDIPORE™ SOFT CLOTH TAPE, 4 INCH X 10 YARDS, 12 ROLLS/CASE, 2964: Brand: 3M™ MEDIPORE™

## (undated) DEVICE — SOLUTION IRRIG 5L LAC R BG

## (undated) DEVICE — 3M™ TEGADERM™ TRANSPARENT FILM DRESSING FRAME STYLE, 1624W, 2-3/8 IN X 2-3/4 IN (6 CM X 7 CM), 100/CT 4CT/CASE: Brand: 3M™ TEGADERM™

## (undated) DEVICE — CANNULA THREADED FLEX 8.0 X 72MM: Brand: CLEAR-TRAC

## (undated) DEVICE — ELECTRODE PT RET AD L9FT HI MOIST COND ADH HYDRGEL CORDED

## (undated) DEVICE — ACCU-PASS SUTURE SHUTTLE CRESCENT, STERILE: Brand: ACCU-PASS